# Patient Record
Sex: MALE | Race: WHITE
[De-identification: names, ages, dates, MRNs, and addresses within clinical notes are randomized per-mention and may not be internally consistent; named-entity substitution may affect disease eponyms.]

---

## 2017-12-23 ENCOUNTER — HOSPITAL ENCOUNTER (EMERGENCY)
Dept: HOSPITAL 62 - ER | Age: 72
Discharge: HOME | End: 2017-12-23
Payer: MEDICARE

## 2017-12-23 VITALS — DIASTOLIC BLOOD PRESSURE: 79 MMHG | SYSTOLIC BLOOD PRESSURE: 152 MMHG

## 2017-12-23 DIAGNOSIS — Z88.5: ICD-10-CM

## 2017-12-23 DIAGNOSIS — E11.9: ICD-10-CM

## 2017-12-23 DIAGNOSIS — I10: ICD-10-CM
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
## 2017-12-23 DIAGNOSIS — S05.02XA: Primary | ICD-10-CM

## 2017-12-23 DIAGNOSIS — Z87.891: ICD-10-CM

## 2017-12-23 PROCEDURE — 99283 EMERGENCY DEPT VISIT LOW MDM: CPT

## 2017-12-23 NOTE — ER DOCUMENT REPORT
ED Eye Complaint





- General


Chief Complaint: Redness of Eye


Stated Complaint: LEFT EYE PAIN


Time Seen by Provider: 12/23/17 15:15


Mode of Arrival: Ambulatory


Information source: Patient


Notes: 





72-year-old male presents to ED for redness to the left eye with discomfort in 

the left eye.  He states that started yesterday.  Hurts when he blinks his eye


TRAVEL OUTSIDE OF THE [place] IN LAST 30 DAYS: No





- HPI


Onset: Yesterday


Eye location: Left


Injury: No


Occurred at: Home


Quality of pain: Burning


Severity: Mild


Pain Level: 1


Associated symptoms: Burning - Slight, Foreign body sensation





- Related Data


Allergies/Adverse Reactions: 


 





morphine Allergy (Verified 12/23/17 14:12)


 











Past Medical History





- General


Information source: Patient





- Social History


Smoking Status: Former Smoker


Cigarette use (# per day): No


Chew tobacco use (# tins/day): No


Smoking Education Provided: No


Frequency of alcohol use: None


Drug Abuse: None


Lives with: Family


Family History: DM, Hypertension


Patient has suicidal ideation: No


Patient has homicidal ideation: No





- Past Medical History


Cardiac Medical History: Reports: Hx Hypertension


Pulmonary Medical History: Reports: None


EENT Medical History: Reports: None


Endocrine Medical History: Reports: Hx Diabetes Mellitus Type 2


Renal/ Medical History: Reports: None


Malignancy Medical History: Reports Other - throat


GI Medical History: Reports: None


Musculoskeltal Medical History: Reports Hx Arthritis


Psychiatric Medical History: Reports: Hx Bipolar Disorder - ptsd, Hx Post 

Traumatic Stress Disorder


Traumatic Medical History: Reports: None


Infectious Medical History: Reports: None


Past Surgical History: Reports: Hx Cholecystectomy





- Immunizations


Immunizations up to date: Yes





Review of Systems





- Review of Systems


Constitutional: No symptoms reported


EENT: Eye pain - red swollen


Cardiovascular: No symptoms reported


Respiratory: No symptoms reported


Gastrointestinal: No symptoms reported


Genitourinary: No symptoms reported


Male Genitourinary: No symptoms reported


Musculoskeletal: No symptoms reported


Skin: No symptoms reported


Hematologic/Lymphatic: No symptoms reported


Neurological/Psychological: No symptoms reported


-: Yes All other systems reviewed and negative





Physical Exam





- Vital signs


Vitals: 


 











Temp Pulse Resp BP Pulse Ox


 


 98.5 F   64   16   144/68 H  98 


 


 12/23/17 14:19  12/23/17 14:19  12/23/17 14:19  12/23/17 14:19  12/23/17 14:19











Interpretation: Normal





- General


General appearance: Appears well, Alert





- HEENT


Head: Normocephalic, Atraumatic


Eyes: Normal


Conjunctiva: Injected.  No: Purulent discharge


Cornea: Corneal abrasion, Flourescein stain uptake


Eyelashes: Normal.  No: Matted, Singed


Pupils: PERRL


Ears: Normal


External canal: Normal


Tympanic membrane: Normal


Hearing loss: Left


Sinus: Normal


Nasal: Normal


Mouth/Lips: Normal


Mucous membranes: Normal


Pharynx: Normal


Neck: Normal





- Respiratory


Respiratory status: No respiratory distress


Chest status: Nontender


Breath sounds: Normal


Chest palpation: Normal





- Cardiovascular


Rhythm: Regular


Heart sounds: Normal auscultation


Murmur: No





- Abdominal


Inspection: Normal


Distension: No distension


Bowel sounds: Normal


Tenderness: Nontender


Organomegaly: No organomegaly





- Back


Back: Normal, Nontender





- Extremities


General upper extremity: Normal inspection, Nontender, Normal color, Normal ROM

, Normal temperature


General lower extremity: Normal inspection, Nontender, Normal color, Normal ROM

, Normal temperature, Normal weight bearing.  No: Woodrow's sign





- Neurological


Neuro grossly intact: Yes


Cognition: Normal


Orientation: AAOx4


Mcleod Coma Scale Eye Opening: Spontaneous


Mcleod Coma Scale Verbal: Oriented


Isac Coma Scale Motor: Obeys Commands


Isac Coma Scale Total: 15


Speech: Normal


Motor strength normal: LUE, RUE, LLE, RLE


Sensory: Normal





- Psychological


Associated symptoms: Normal affect, Normal mood





- Skin


Skin Temperature: Warm


Skin Moisture: Dry


Skin Color: Normal





Course





- Vital Signs


Vital signs: 


 











Temp Pulse Resp BP Pulse Ox


 


 97.9 F   59 L  16   152/79 H  99 


 


 12/23/17 16:56  12/23/17 16:56  12/23/17 14:19  12/23/17 16:56  12/23/17 16:56














Discharge





- Discharge


Clinical Impression: 


Corneal abrasion


Qualifiers:


 Encounter type: initial encounter Laterality: left Qualified Code(s): S05.02XA 

- Injury of conjunctiva and corneal abrasion without foreign body, left eye, 

initial encounter





Condition: Stable


Disposition: HOME, SELF-CARE


Additional Instructions: 


Corneal Abrasion





     You have a corneal abrasion, a scratch on the surface of the eye. The pain 

of a corneal abrasion feels like a sharp particle in the eye.


     Usually, antibiotics are placed in the eye to prevent infection.  

Occasionally, medication will be placed in the eye to dilate the pupil.  This 

is done to relieve some of your discomfort and is only temporary.  Pain 

medication may be required.


     Don't drive or operate machinery until you have the use of both your eyes.

  The abrasion usually is healed in one or two days.  A follow-up examination 

to confirm healing is recommended.


     Call the doctor or return at once if you develop severe pain, decreasing 

vision, eye swelling, or purulent drainage.





EYEDROP USE:


     Eyedrops are most easily applied by pulling down on the cheek just below 

the lower eyelid.  The lower lid will pop out to form a pouch into which you 

can drop the medicine.


     A small brief sting is not unusual, especially if the eye is reddened and 

irritated already.


     Use the drops exactly as recommended.  You should see the doctor at once 

if there is a decrease in vision, swelling of the eye, or an increase in 

discomfort.








ANTIBIOTIC THERAPY:


     You have been given an antibiotic prescription.  It's important that you 

take all the medication, unless instructed otherwise by your physician.  

Failure to complete the entire course can result in relapse of your condition.


     Common side effects of antibiotics include nausea, intestinal cramping, or 

diarrhea.  Women may develop vaginal yeast infections, and babies can get yeast 

(thrush) in the mouth following the use of antibiotics.  Contact your physician 

if you develop significant side effects from this medication.


     Allergy to this antibiotic can result in hives, wheezing, faintness, or 

itching.  If symptoms of allergy occur, stop the medication and call the doctor.








FOLLOW-UP CARE:


If you have been referred to a physician for follow-up care, call the physician

s office for an appointment as you were instructed or within the next two days.

  If you experience worsening or a significant change in your symptoms, notify 

the physician immediately or return to the Emergency Department at any time for 

re-evaluation.








Forms:  Elevated Blood Pressure


Referrals: 


LARRY LAO MD [ACTIVE STAFF] - Follow up as needed

## 2018-05-17 ENCOUNTER — HOSPITAL ENCOUNTER (EMERGENCY)
Dept: HOSPITAL 62 - ER | Age: 73
Discharge: HOME | End: 2018-05-17
Payer: MEDICARE

## 2018-05-17 VITALS — SYSTOLIC BLOOD PRESSURE: 136 MMHG | DIASTOLIC BLOOD PRESSURE: 62 MMHG

## 2018-05-17 DIAGNOSIS — J30.2: ICD-10-CM

## 2018-05-17 DIAGNOSIS — R09.81: ICD-10-CM

## 2018-05-17 DIAGNOSIS — Z87.891: ICD-10-CM

## 2018-05-17 DIAGNOSIS — E11.9: ICD-10-CM

## 2018-05-17 DIAGNOSIS — J40: Primary | ICD-10-CM

## 2018-05-17 DIAGNOSIS — Z88.5: ICD-10-CM

## 2018-05-17 DIAGNOSIS — I10: ICD-10-CM

## 2018-05-17 DIAGNOSIS — R05: ICD-10-CM

## 2018-05-17 DIAGNOSIS — R06.2: ICD-10-CM

## 2018-05-17 DIAGNOSIS — Z85.810: ICD-10-CM

## 2018-05-17 PROCEDURE — 94640 AIRWAY INHALATION TREATMENT: CPT

## 2018-05-17 PROCEDURE — 71046 X-RAY EXAM CHEST 2 VIEWS: CPT

## 2018-05-17 PROCEDURE — 99283 EMERGENCY DEPT VISIT LOW MDM: CPT

## 2018-05-17 PROCEDURE — 96372 THER/PROPH/DIAG INJ SC/IM: CPT

## 2018-05-17 NOTE — ER DOCUMENT REPORT
ED Medical Screen (RME)





- General


Chief Complaint: Cough


Stated Complaint: CONGESTION/COUGH


Time Seen by Provider: 05/17/18 19:20


Mode of Arrival: Ambulatory


Information source: Patient


Notes: 





73-year-old male with a history of Type2 DM, tongue cancer, bipolar disorder 

presents with complaint of cough, nasal congestion and wheezing for 1 week.  

Wife is here with similar symptoms.  He denies any fever, chills, shortness of 

breath, chest pain.





Have greeted and performed a rapid initial assessment of this patient a 

comprehensive ED assessment and evaluation of the patient, analysis of test 

results and completion of medical decision making will be conducted by an 

additional ED provider.





PHYSICAL EXAMINATION:





GENERAL: Well-appearing, well-nourished and in no acute distress.





LUNGS: No respiratory distress, no wheezing





Musculoskeletal: Normal range of motion





NEUROLOGICAL:  Normal speech, normal gait. 





PSYCH: Normal mood, normal affect.





SKIN: Warm, Dry, normal turgor, no rashes or lesions noted.


TRAVEL OUTSIDE OF THE [place] IN LAST 30 DAYS: No





- HPI


Onset: Last week


Onset/Duration: Gradual, Persistent


Quality of pain: No pain


Associated Symptoms: Cough (productive), Sinus pain/drainage


Exacerbated by: Denies


Relieved by: Denies


Similar symptoms previously: Yes


Recently seen / treated by doctor: Yes





- Related Data


Smoking: Quit greater than 1 year


Frequency of alcohol use: None


Drug Abuse: None


Allergies/Adverse Reactions: 


 





morphine Allergy (Verified 05/17/18 18:55)


 











Past Medical History





- Social History


Chew tobacco use (# tins/day): No


Frequency of alcohol use: None


Drug Abuse: None





- Past Medical History


Cardiac Medical History: Reports: Hx Hypertension


Endocrine Medical History: Reports: Hx Diabetes Mellitus Type 2


Renal/ Medical History: Denies: Hx Peritoneal Dialysis


Musculoskeltal Medical History: Reports Hx Arthritis


Psychiatric Medical History: Reports: Hx Bipolar Disorder - ptsd, Hx Post 

Traumatic Stress Disorder


Past Surgical History: Reports: Hx Cholecystectomy





- Immunizations


Immunizations up to date: Yes





Physical Exam





- Vital signs


Vitals: 





 











Temp Pulse Resp BP Pulse Ox


 


 98.4 F   81   16   138/88 H  96 


 


 05/17/18 19:07  05/17/18 19:07  05/17/18 19:07  05/17/18 19:07  05/17/18 19:07














Course





- Vital Signs


Vital signs: 





 











Temp Pulse Resp BP Pulse Ox


 


 98.4 F   81   16   138/88 H  96 


 


 05/17/18 19:07  05/17/18 19:07  05/17/18 19:07  05/17/18 19:07  05/17/18 19:07

## 2018-05-17 NOTE — ER DOCUMENT REPORT
ED Respiratory Problem





- General


Chief Complaint: Cough


Stated Complaint: CONGESTION/COUGH


Time Seen by Provider: 05/17/18 19:20


Mode of Arrival: Ambulatory


Notes: 


73-year-old male with a history of Type2 DM, tongue cancer, bipolar disorder, 

bronchitis, presents with complaint of cough, nasal congestion and wheezing for 

1 week.  Wife is here with similar symptoms.  He usually has these symptoms 

about once a year.  Denies fevers, chest pain, leg swelling, nausea or vomiting.


TRAVEL OUTSIDE OF THE [place] IN LAST 30 DAYS: No





- Related Data


Allergies/Adverse Reactions: 


 





morphine Allergy (Verified 05/17/18 18:55)


 











Past Medical History





- General


Information source: Patient





- Social History


Smoking Status: Former Smoker


Chew tobacco use (# tins/day): No


Frequency of alcohol use: None


Drug Abuse: None


Family History: DM, Hypertension


Patient has suicidal ideation: No


Patient has homicidal ideation: No





- Past Medical History


Cardiac Medical History: Reports: Hx Hypertension


Endocrine Medical History: Reports: Hx Diabetes Mellitus Type 2


Renal/ Medical History: Denies: Hx Peritoneal Dialysis


Musculoskeltal Medical History: Reports Hx Arthritis


Psychiatric Medical History: Reports: Hx Bipolar Disorder - ptsd, Hx Post 

Traumatic Stress Disorder


Past Surgical History: Reports: Hx Cholecystectomy





- Immunizations


Immunizations up to date: Yes





Review of Systems





- Review of Systems


Notes: 


REVIEW OF SYSTEMS:


CONSTITUTIONAL: -fevers, -chills


EENT: -eye pain, -difficulty swallowing, +nasal congestion


CARDIOVASCULAR: -chest pain, -syncope.


RESPIRATORY: +cough, -SOB


GASTROINTESTINAL: -abdominal pain, -nausea, -vomiting, -diarrhea


GENITOURINARY: -dysuria, -hematuria


MUSCULOSKELETAL: -back pain, -neck pain


SKIN: -rash or skin lesions.


HEMATOLOGIC: -easy bruising or bleeding.


LYMPHATIC: -swollen, enlarged glands.


NEUROLOGICAL: -altered mental status or loss of consciousness, -headache, -

neurologic symptoms


PSYCHIATRIC: -anxiety, -depression.


ALL OTHER SYSTEMS REVIEWED AND NEGATIVE.





Physical Exam





- Vital signs


Vitals: 


 











Temp Pulse Resp BP Pulse Ox


 


 98.4 F   81   16   138/88 H  96 


 


 05/17/18 19:07  05/17/18 19:07  05/17/18 19:07  05/17/18 19:07  05/17/18 19:07














- Notes


Notes: 


PHYSICAL EXAMINATION:


GENERAL: Well-appearing, well-nourished and in no acute distress.


HEAD: Atraumatic, normocephalic.


EYES: Pupils equal round and reactive to light, extraocular movements intact, 

sclera anicteric, conjunctiva are normal.


ENT: Swollen nasal turbinates with clear drainage, cobblestoning of posterior 

pharynx, nares patent, oropharynx clear without exudates.  Moist mucous 

membranes.


NECK: Normal range of motion, supple without lymphadenopathy


LUNGS: Breath sounds clear to auscultation bilaterally and equal.  Mild 

wheezing.


HEART: Regular rate and rhythm without murmurs


ABDOMEN: Soft, nontender, normoactive bowel sounds.  No guarding, no rebound.  

No masses appreciated.


EXTREMITIES: Normal range of motion, no pitting or edema.  No cyanosis.


NEUROLOGICAL: Cranial nerves grossly intact.  Normal speech, normal gait.  

Normal sensory and motor exams.


PSYCH: Normal mood, normal affect.


SKIN: Warm, Dry, normal turgor, no rashes or lesions noted.





Course





- Re-evaluation


Re-evalutation: 


Patient appears very well.  Suspect his symptoms are related to allergic 

rhinitis and bronchitis.  No signs of pneumonia on chest x-ray.  Will discharge 

patient home with Flonase, Zyrtec instructions to use his albuterol with follow-

up at his primary care physician.





- Vital Signs


Vital signs: 


 











Temp Pulse Resp BP Pulse Ox


 


 98.7 F   66   14   136/62 H  97 


 


 05/17/18 19:12  05/17/18 19:12  05/17/18 19:12  05/17/18 19:12  05/17/18 19:12














- Diagnostic Test


Radiology reviewed: Image reviewed, Reports reviewed


Radiology results interpreted by me: 


CXR: NAD





Discharge





- Discharge


Clinical Impression: 


 Bronchitis





Allergic rhinitis


Qualifiers:


 Allergic rhinitis trigger: unspecified Allergic rhinitis seasonality: seasonal 

Qualified Code(s): J30.2 - Other seasonal allergic rhinitis





Condition: Stable


Disposition: HOME, SELF-CARE


Additional Instructions: 


BRONCHITIS:





     You have acute bronchitis.  This disease is an infection or inflammation 

of the air passageways in your lungs.  Symptoms usually include cough, low 

grade fever, shortness of breath, and wheezing.  The cough usually persists for 

a couple of weeks.


     Most cases of bronchitis get better without antibiotics.  We prescribe 

antibiotics when we believe bacteria are damaging your airways, or if there's 

high risk the bronchitis will worsen into pneumonia.


     Increase your fluid intake. A cool mist humidifier may make your lungs 

more comfortable.  An expectorant (cough medicine that loosens phlegm) can 

help.  If you smoke, STOP!!!  Recovery from bronchitis can be somewhat slow, 

but you should see improvement within a day or two.


     Repeated episodes of bronchitis may result in lung damage -- for example, 

chronic bronchitis, recurrent pneumonias, or emphysema.


     Call the doctor if you develop increasing fever, shortness of breath, 

chest pain, bloody sputum, or otherwise worsen.  If you have not improved at 

all after several days, contact the physician.








STEROID MEDICATION:


     You have been given an injection of or oral medicine of the cortisone/

steroid class.  This medication is used to control inflammation or allergy.  

Jono t is usually only given for a short period of time, until the acute process 

subsides.


     There are usually no side effects from short-term use of cortisone-like 

medications.  Some persons feel an increased sense of well-being and are not 

sleepy at bedtime.  Long-term use of cortisone medications is best avoided, 

unless required for a severe condition.  If your condition does not remit, or 

relapses after the course of corticosteroid medication, you should consult your 

physician.





USE OF ACETAMINOPHEN (Tylenol):


     Acetaminophen may be taken for pain relief or fever control. It's much 

safer than aspirin, offering a wider range of "safe" dosages.  It is safe 

during pregnancy.  Some brand names are Tylenol, Panadol, Datril, Anacin 3, 

Tempra, and Liquiprin. Acetaminophen can be repeated every four hours.  The 

following are maximum recommended dosages:


>89 pounds or adults          650 mg to 900 mg


Acetaminophen can be repeated every four hours.  Maximum dose not to exceed 

4000 mg a day.








SMOKING:


     If you smoke, you should stop smoking.  The tar and chemicals in cigarette 

smoke are harmful.  Smoking has been shown to cause:


          emphysema


          chronic bronchitis


          lung cancer


          mouth and throat cancer


          stomach and pancreas cancer


          premature aging


          birth defects


     In addition, smoking increases ear and lung infections in children of 

smokers.








FOLLOW-UP CARE:


If you have been referred to a physician for follow-up care, call the physician

s office for an appointment as you were instructed or within the next two days.

  If you experience worsening or a significant change in your symptoms, notify 

the physician immediately or return to the Emergency Department at any time for 

re-evaluation.





Prescriptions: 


Fluticasone Propionate [Flonase Nasal Spray 50 Mcg/Spray 16 gm] 1 spray NASL 

Q12 #1 inhaler


Forms:  Elevated Blood Pressure


Referrals: 


BOB ANDRADE MD [ACTIVE STAFF] - Follow up as needed

## 2018-05-17 NOTE — RADIOLOGY REPORT (SQ)
EXAM DESCRIPTION:  CHEST 2 VIEWS



COMPLETED DATE/TIME:  5/17/2018 7:31 pm



REASON FOR STUDY:  cough wheezing



COMPARISON:  None.



EXAM PARAMETERS:  NUMBER OF VIEWS: two views

TECHNIQUE: Digital Frontal and Lateral radiographic views of the chest acquired.

RADIATION DOSE: NA

LIMITATIONS: none



FINDINGS:  LUNGS AND PLEURA: No opacities, masses or pneumothorax. No pleural effusion.

MEDIASTINUM AND HILAR STRUCTURES: No masses or contour abnormalities.

HEART AND VASCULAR STRUCTURES: Heart normal size.  No evidence for failure.

BONES: No acute findings.

HARDWARE: None in the chest.

OTHER: No other significant finding.



IMPRESSION:  NO ACUTE RADIOGRAPHIC FINDING IN THE CHEST.



TECHNICAL DOCUMENTATION:  JOB ID:  [id number]

 2011 [employer]- All Rights Reserved



Reading location - IP/workstation name: [email or web address]

## 2018-07-21 ENCOUNTER — HOSPITAL ENCOUNTER (OUTPATIENT)
Dept: HOSPITAL 62 - ER | Age: 73
Setting detail: OBSERVATION
LOS: 2 days | Discharge: HOME | End: 2018-07-23
Attending: INTERNAL MEDICINE | Admitting: INTERNAL MEDICINE
Payer: MEDICARE

## 2018-07-21 DIAGNOSIS — R11.10: ICD-10-CM

## 2018-07-21 DIAGNOSIS — E11.9: ICD-10-CM

## 2018-07-21 DIAGNOSIS — Z82.49: ICD-10-CM

## 2018-07-21 DIAGNOSIS — F40.240: ICD-10-CM

## 2018-07-21 DIAGNOSIS — Z92.3: ICD-10-CM

## 2018-07-21 DIAGNOSIS — Z53.29: ICD-10-CM

## 2018-07-21 DIAGNOSIS — Z90.49: ICD-10-CM

## 2018-07-21 DIAGNOSIS — G83.24: ICD-10-CM

## 2018-07-21 DIAGNOSIS — Z98.890: ICD-10-CM

## 2018-07-21 DIAGNOSIS — I63.9: Primary | ICD-10-CM

## 2018-07-21 DIAGNOSIS — R13.11: ICD-10-CM

## 2018-07-21 DIAGNOSIS — J18.1: ICD-10-CM

## 2018-07-21 DIAGNOSIS — I69.354: ICD-10-CM

## 2018-07-21 DIAGNOSIS — R20.0: ICD-10-CM

## 2018-07-21 DIAGNOSIS — Z85.810: ICD-10-CM

## 2018-07-21 DIAGNOSIS — I10: ICD-10-CM

## 2018-07-21 DIAGNOSIS — Z87.891: ICD-10-CM

## 2018-07-21 LAB
ADD MANUAL DIFF: NO
ALBUMIN SERPL-MCNC: 4.2 G/DL (ref 3.5–5)
ALP SERPL-CCNC: 134 U/L (ref 38–126)
ALT SERPL-CCNC: 19 U/L (ref 21–72)
ANION GAP SERPL CALC-SCNC: 18 MMOL/L (ref 5–19)
AST SERPL-CCNC: 18 U/L (ref 17–59)
BASOPHILS # BLD AUTO: 0 10^3/UL (ref 0–0.2)
BASOPHILS NFR BLD AUTO: 0.2 % (ref 0–2)
BILIRUB DIRECT SERPL-MCNC: 0.5 MG/DL (ref 0–0.4)
BILIRUB SERPL-MCNC: 1.4 MG/DL (ref 0.2–1.3)
BUN SERPL-MCNC: 16 MG/DL (ref 7–20)
CALCIUM: 9.5 MG/DL (ref 8.4–10.2)
CHLORIDE SERPL-SCNC: 103 MMOL/L (ref 98–107)
CK MB SERPL-MCNC: 0.37 NG/ML (ref ?–4.55)
CK SERPL-CCNC: 30 U/L (ref 55–170)
CO2 SERPL-SCNC: 23 MMOL/L (ref 22–30)
EOSINOPHIL # BLD AUTO: 0.2 10^3/UL (ref 0–0.6)
EOSINOPHIL NFR BLD AUTO: 0.9 % (ref 0–6)
ERYTHROCYTE [DISTWIDTH] IN BLOOD BY AUTOMATED COUNT: 14.2 % (ref 11.5–14)
GLUCOSE SERPL-MCNC: 219 MG/DL (ref 75–110)
HCT VFR BLD CALC: 36.6 % (ref 37.9–51)
HGB BLD-MCNC: 12.7 G/DL (ref 13.5–17)
INR PPP: 1.13
LYMPHOCYTES # BLD AUTO: 1 10^3/UL (ref 0.5–4.7)
LYMPHOCYTES NFR BLD AUTO: 5.5 % (ref 13–45)
MCH RBC QN AUTO: 31.7 PG (ref 27–33.4)
MCHC RBC AUTO-ENTMCNC: 34.6 G/DL (ref 32–36)
MCV RBC AUTO: 92 FL (ref 80–97)
MONOCYTES # BLD AUTO: 0.9 10^3/UL (ref 0.1–1.4)
MONOCYTES NFR BLD AUTO: 5.1 % (ref 3–13)
NEUTROPHILS # BLD AUTO: 15.5 10^3/UL (ref 1.7–8.2)
NEUTS SEG NFR BLD AUTO: 88.3 % (ref 42–78)
PLATELET # BLD: 237 10^3/UL (ref 150–450)
POTASSIUM SERPL-SCNC: 3.8 MMOL/L (ref 3.6–5)
PROT SERPL-MCNC: 8.1 G/DL (ref 6.3–8.2)
PROTHROMBIN TIME: 15.1 SEC (ref 11.4–15.4)
RBC # BLD AUTO: 3.99 10^6/UL (ref 4.35–5.55)
SODIUM SERPL-SCNC: 143.7 MMOL/L (ref 137–145)
TOTAL CELLS COUNTED % (AUTO): 100 %
TROPONIN I SERPL-MCNC: < 0.012 NG/ML
WBC # BLD AUTO: 17.5 10^3/UL (ref 4–10.5)

## 2018-07-21 PROCEDURE — 36415 COLL VENOUS BLD VENIPUNCTURE: CPT

## 2018-07-21 PROCEDURE — 87040 BLOOD CULTURE FOR BACTERIA: CPT

## 2018-07-21 PROCEDURE — 93880 EXTRACRANIAL BILAT STUDY: CPT

## 2018-07-21 PROCEDURE — 92611 MOTION FLUOROSCOPY/SWALLOW: CPT

## 2018-07-21 PROCEDURE — 82550 ASSAY OF CK (CPK): CPT

## 2018-07-21 PROCEDURE — 71045 X-RAY EXAM CHEST 1 VIEW: CPT

## 2018-07-21 PROCEDURE — 85610 PROTHROMBIN TIME: CPT

## 2018-07-21 PROCEDURE — 84484 ASSAY OF TROPONIN QUANT: CPT

## 2018-07-21 PROCEDURE — 93010 ELECTROCARDIOGRAM REPORT: CPT

## 2018-07-21 PROCEDURE — 80053 COMPREHEN METABOLIC PANEL: CPT

## 2018-07-21 PROCEDURE — 96368 THER/DIAG CONCURRENT INF: CPT

## 2018-07-21 PROCEDURE — G0378 HOSPITAL OBSERVATION PER HR: HCPCS

## 2018-07-21 PROCEDURE — 99285 EMERGENCY DEPT VISIT HI MDM: CPT

## 2018-07-21 PROCEDURE — 96365 THER/PROPH/DIAG IV INF INIT: CPT

## 2018-07-21 PROCEDURE — 80061 LIPID PANEL: CPT

## 2018-07-21 PROCEDURE — 82553 CREATINE MB FRACTION: CPT

## 2018-07-21 PROCEDURE — 93005 ELECTROCARDIOGRAM TRACING: CPT

## 2018-07-21 PROCEDURE — 97163 PT EVAL HIGH COMPLEX 45 MIN: CPT

## 2018-07-21 PROCEDURE — 97165 OT EVAL LOW COMPLEX 30 MIN: CPT

## 2018-07-21 PROCEDURE — 74230 X-RAY XM SWLNG FUNCJ C+: CPT

## 2018-07-21 PROCEDURE — 82962 GLUCOSE BLOOD TEST: CPT

## 2018-07-21 PROCEDURE — 70450 CT HEAD/BRAIN W/O DYE: CPT

## 2018-07-21 PROCEDURE — 85025 COMPLETE CBC W/AUTO DIFF WBC: CPT

## 2018-07-21 NOTE — ER DOCUMENT REPORT
ED Medical Screen (RME)





- General


Chief Complaint: S/S of Possible Stroke


Stated Complaint: POSSIBLE STROKE


Time Seen by Provider: 07/21/18 14:56


TRAVEL OUTSIDE OF THE [place] IN LAST 30 DAYS: No





- HPI


Notes: 





07/21/18 14:59


Patient coming in for left-sided weakness ongoing for greater than left wrist.  

Patient states this morning got up could not stand therefore felt.  Patient 

does have history of tongue cancer.





- Related Data


Allergies/Adverse Reactions: 


 





morphine Allergy (Verified 07/21/18 13:39)


 











Past Medical History





- Past Medical History


Cardiac Medical History: Reports: Hx Hypertension


Endocrine Medical History: Reports: Hx Diabetes Mellitus Type 2


Renal/ Medical History: Denies: Hx Peritoneal Dialysis


Musculoskeltal Medical History: Reports Hx Arthritis


Psychiatric Medical History: Reports: Hx Bipolar Disorder - ptsd, Hx Post 

Traumatic Stress Disorder


Past Surgical History: Reports: Hx Cholecystectomy





- Immunizations


Immunizations up to date: Yes





Review of Systems





- Review of Systems


Constitutional: Weakness





Physical Exam





- Vital signs


Vitals: 





 











Temp Pulse Resp BP Pulse Ox


 


 98.3 F   79   20   116/63   98 


 


 07/21/18 13:55  07/21/18 13:55  07/21/18 13:55  07/21/18 13:55  07/21/18 13:55














- Extremities


Notes: 





Patient with symmetrical smile in triage patient with slight weakness on the 

left upper extremity as far as  strength however he is able to push pull 





Course





- Vital Signs


Vital signs: 





 











Temp Pulse Resp BP Pulse Ox


 


 98.3 F   79   20   116/63   98 


 


 07/21/18 13:55  07/21/18 13:55  07/21/18 13:55  07/21/18 13:55  07/21/18 13:55

## 2018-07-21 NOTE — RADIOLOGY REPORT (SQ)
EXAM DESCRIPTION:  CT HEAD WITHOUT



COMPLETED DATE/TIME:  7/21/2018 3:21 pm



REASON FOR STUDY:  left up ext weakness >24



COMPARISON:  None.



TECHNIQUE:  Axial images acquired through the brain without intravenous contrast.  Images reviewed wi
th bone, brain and subdural windows.   Images stored on PACS.

All CT scanners at this facility use dose modulation, iterative reconstruction, and/or weight based d
osing when appropriate to reduce radiation dose to as low as reasonably achievable (ALARA).

[facility]: Dose Right  [facility]: CareDose    [facility]: Dose Right    [facility]: Teradose 4D    [facility]: Smart Technologies



RADIATION DOSE:  CT Rad equipment meets quality standard of care and radiation dose reduction techniq
ues were employed. CTDIvol: 53.2 mGy. DLP: 911 mGy-cm.mGy.



LIMITATIONS:  None.



FINDINGS:   VENTRICLES: Prominent.

CEREBRUM: No mass effect.  No hemorrhage.  No midline shift.  Areas of low density in the white matte
r most likely due to chronic micro-vascular ischemic change.  No evidence for acute territorial infar
ction.

CEREBELLUM: No hemorrhage.  No alteration of density.  No evidence for acute infarction.

EXTRAAXIAL SPACES: Age-related involutional change.  No fluid collections.

ORBITS AND GLOBE: Symmetrical contour of the globes.

CALVARIUM: No depressed fracture.

PARANASAL SINUSES: No air-fluid level.

SOFT TISSUES:  No hematoma.



IMPRESSION:  No acute intracranial hemorrhage or acute territorial infarct.  Mild chronic changes of 
atrophy and microvascular ischemia.

EVIDENCE OF ACUTE STROKE: NO.



TECHNICAL DOCUMENTATION:  JOB ID:  [id number]

[facility]

Quality ID # [id number]: Final reports with documentation of one or more dose reduction techniques (e.g., Au
tomated exposure control, adjustment of the mA and/or kV according to patient size, use of iterative 
reconstruction technique)

 2011 [employer]- All Rights Reserved



Reading location - IP/workstation name: [email or web address]

## 2018-07-21 NOTE — RADIOLOGY REPORT (SQ)
EXAM DESCRIPTION:  CHEST SINGLE VIEW



COMPLETED DATE/TIME:  7/21/2018 3:32 pm



REASON FOR STUDY:  weakness



COMPARISON:  Chest x-ray 5/17/2018.



EXAM PARAMETERS:  NUMBER OF VIEWS: One view.

TECHNIQUE: Single frontal radiographic view of the chest acquired.

RADIATION DOSE: NA

LIMITATIONS: None.



FINDINGS:  LUNGS AND PLEURA: There is right perihilar airspace opacity.  No sizable pleural effusion 
or pneumothorax.

MEDIASTINUM AND HILAR STRUCTURES: Contour within normal limits.

HEART AND VASCULAR STRUCTURES: Heart normal in size.  Normal vasculature.

BONES: Multilevel degenerative changes are noted within the spine.

HARDWARE: None in the chest.



IMPRESSION:  Right perihilar airspace opacity, may be secondary to pneumonia.  Radiographic followup 
recommended to ensure complete resolution and exclude a different etiology.



TECHNICAL DOCUMENTATION:  JOB ID:  [id number]

[id number]

 2011 [employer]- All Rights Reserved



Reading location - IP/workstation name: [email or web address]

## 2018-07-21 NOTE — PDOC H&P
History of Present Illness


Admission Date/PCP: 


  





  ROSA RENO MD





Patient complains of: Altered mental status and left arm weakness


History of Present Illness: 


DARÍO HARTMANN is a 73 year old male with past medical history of essential 

hypertension, dyslipidemia, diabetes mellitus type 2 on insulin and tongue 

cancer status post resection and XRT. Patient presents to [facility]'s emergency room this afternoon complaining of episode of dizziness 

yesterday at 10 AM in which he fell landing on his knee.  Patient since then 

has had left-sided weakness to the left upper extremity.  Patient states he has 

a decreased  to the left hand and has a decrease in sensation to the left 

upper arm.  Patient's family states that he had confusion after this episode 

yesterday that has gradually started to resolve and return to his normal 

baseline today.  Wife states that he does have some memory issues after 

treatment of tongue cancer with radiation.  Patient's wife states that his 

confusion yesterday was not normal for him.  Patient additionally has had a 

cough for the past 2 weeks.  Patient was placed on amoxicillin 5 days ago but 

only took it for 2 days and then refused to take any more.  Patient denies any 

chest pain, back pain headache or any dizzy symptoms at this time.  Patient did 

have an episode of vomiting yesterday when this episode occurred.  Patient's 

last known well was prior to 10 AM yesterday.











Past Medical History


Cardiac Medical History: Reports: Hypertension


Pulmonary Medical History: Reports: None


EENT Medical History: Reports: None


Neurological Medical History: Reports: None


Endocrine Medical History: Reports: Diabetes Mellitus Type 2


Renal/ Medical History: Reports: None


Malignancy Medical History: Reports: Other - Tongue cancer


GI Medical History: Reports: None


Musculoskeltal Medical History: Reports: Arthritis


Skin Medical History: Reports: None


Psychiatric Medical History: Reports: Bipolar Disorder - ptsd, Post Traumatic 

Stress Disorder


Traumatic Medical History: Reports: None


Hematology: Reports: None


Infectious Medical History: Reports: None





Past Surgical History


Past Surgical History: Reports: Cholecystectomy, Other - Glossal resection  PEG 

tube insertion





Social History


Information Source: Patient


Lives with: Family


Smoking Status: Former Smoker


Last Time Smoked: Greater than 20 years ago


Frequency of Alcohol Use: Occasional


Hx Recreational Drug Use: No


Hx Prescription Drug Abuse: No





- Advance Directive


Resuscitation Status: Full Code


Surrogate healthcare decision maker:: 





Wife





Family History


Family History: DM, Hypertension


Parental Family History Reviewed: Yes


Children Family History Reviewed: Yes


Sibling(s) Family History Reviewed.: Yes





Medication/Allergy


Home Medications: 








Fluticasone Propionate [Flonase Nasal Spray 50 Mcg/Spray 16 gm] 1 spray NASL 

Q12 #1 inhaler 05/17/18 








Allergies/Adverse Reactions: 


 





morphine Allergy (Verified 07/21/18 13:39)


 











Review of Systems


Constitutional: PRESENT: chills, fever(s) - Days ago


Eyes: ABSENT: visual disturbances


Ears: ABSENT: hearing changes


Cardiovascular: ABSENT: chest pain, dyspnea on exertion, edema, orthropnea, 

palpitations


Respiratory: ABSENT: cough, hemoptysis


Gastrointestinal: ABSENT: abdominal pain, constipation, diarrhea, hematemesis, 

hematochezia, nausea, vomiting


Genitourinary: ABSENT: dysuria, hematuria


Musculoskeletal: ABSENT: joint swelling


Integumentary: ABSENT: rash, wounds


Neurological: ABSENT: abnormal gait, abnormal speech, confusion, dizziness, 

focal weakness, syncope


Psychiatric: ABSENT: anxiety, depression, homidical ideation, suicidal ideation


Endocrine: ABSENT: cold intolerance, heat intolerance, polydipsia, polyuria


Hematologic/Lymphatic: ABSENT: easy bleeding, easy bruising





Physical Exam


Vital Signs: 


 











Temp Pulse Resp BP Pulse Ox


 


 98.3 F   79   20   116/63   95 


 


 07/21/18 13:55  07/21/18 13:55  07/21/18 13:55  07/21/18 13:55  07/21/18 15:01








 Intake & Output











 07/20/18 07/21/18 07/22/18





 06:59 06:59 06:59


 


Weight   70.307 kg











General appearance: PRESENT: no acute distress, well-developed, well-nourished


Head exam: PRESENT: atraumatic, normocephalic


Eye exam: PRESENT: conjunctiva pink, EOMI, PERRLA.  ABSENT: scleral icterus


Ear exam: PRESENT: normal external ear exam


Mouth exam: PRESENT: moist, tongue midline


Neck exam: ABSENT: carotid bruit, JVD, lymphadenopathy, thyromegaly


Respiratory exam: PRESENT: crackles, symmetrical, unlabored - left basilar 

crackles.  ABSENT: rales, rhonchi, wheezes


Cardiovascular exam: PRESENT: RRR.  ABSENT: diastolic murmur, rubs, systolic 

murmur


Pulses: PRESENT: normal dorsalis pedis pul


Vascular exam: PRESENT: normal capillary refill


GI/Abdominal exam: PRESENT: normal bowel sounds, soft.  ABSENT: distended, 

guarding, mass, organolmegaly, rebound, tenderness


Rectal exam: PRESENT: deferred


Extremities exam: PRESENT: full ROM.  ABSENT: calf tenderness, clubbing, pedal 

edema


Musculoskeletal exam: PRESENT: ambulatory, dislocation, full ROM


Neurological exam: PRESENT: alert, awake, oriented to person, oriented to place

, oriented to time, oriented to situation, CN II-XII grossly intact.  ABSENT: 

motor sensory deficit


Psychiatric exam: PRESENT: appropriate affect, normal mood.  ABSENT: homicidal 

ideation, suicidal ideation


Skin exam: PRESENT: dry, intact, warm.  ABSENT: cyanosis, rash





Results


Laboratory Results: 


 





 07/21/18 15:27 





 07/21/18 15:27 





 











  07/21/18 07/21/18





  15:27 15:27


 


WBC  17.5 H 


 


RBC  3.99 L 


 


Hgb  12.7 L 


 


Hct  36.6 L 


 


MCV  92 


 


MCH  31.7 


 


MCHC  34.6 


 


RDW  14.2 H 


 


Plt Count  237 


 


Seg Neutrophils %  88.3 H 


 


Lymphocytes %  5.5 L 


 


Monocytes %  5.1 


 


Eosinophils %  0.9 


 


Basophils %  0.2 


 


Absolute Neutrophils  15.5 H 


 


Absolute Lymphocytes  1.0 


 


Absolute Monocytes  0.9 


 


Absolute Eosinophils  0.2 


 


Absolute Basophils  0.0 


 


Sodium   143.7


 


Potassium   3.8


 


Chloride   103


 


Carbon Dioxide   23


 


Anion Gap   18


 


BUN   16


 


Creatinine   1.11


 


Est GFR ( Amer)   > 60


 


Est GFR (Non-Af Amer)   > 60


 


Glucose   219 H


 


Calcium   9.5


 


Total Bilirubin   1.4 H


 


AST   18


 


ALT   19 L


 


Alkaline Phosphatase   134 H


 


Total Protein   8.1


 


Albumin   4.2








 











  07/21/18 07/21/18





  15:27 15:27


 


Creatine Kinase  30 L 


 


CK-MB (CK-2)   0.37


 


Troponin I   < 0.012











Impressions: 


 





Chest X-Ray  07/21/18 15:01


IMPRESSION:  Right perihilar airspace opacity, may be secondary to pneumonia.  

Radiographic followup recommended to ensure complete resolution and exclude a 

different etiology.


 








Head CT  07/21/18 15:01


IMPRESSION:  No acute intracranial hemorrhage or acute territorial infarct.  

Mild chronic changes of atrophy and microvascular ischemia.


EVIDENCE OF ACUTE STROKE: NO.


 














Assessment & Plan





- Diagnosis


(1) CVA (cerebral vascular accident)


Is this a current diagnosis for this admission?: Yes   


Plan: 


We will admit to rule out possible CVA.  He has minimal left hand weaknes 

initial CT scan was negative.








(2) Diabetes mellitus


Qualifiers: 


   Diabetes mellitus type: type 2 


Is this a current diagnosis for this admission?: Yes   


Plan: 


Lantus and sliding scale insulin








(3) Essential hypertension


Is this a current diagnosis for this admission?: Yes   


Plan: 


Since of the present time.








(4) Decreased  strength of left hand


Is this a current diagnosis for this admission?: Yes   


Plan: 


As above in #1.  He started on aspirin and statin








(5) Left arm numbness


Is this a current diagnosis for this admission?: Yes   





(6) Pneumonia


Qualifiers: 


   Pneumonia type: due to unspecified organism   Laterality: right   Lung 

location: unspecified part of lung   Qualified Code(s): J18.9 - Pneumonia, 

unspecified organism   


Is this a current diagnosis for this admission?: Yes   


Plan: 


Right lower lobe infiltrate.  His wife had bronchitis and pneumonia 2 weeks 

ago.  He was started on amoxicillin 5 days ago and only take it for 2 days and 

stop.  He states he had a fever back then but none since then.  He is room air 

oxygen saturations 98% he is not tachycardic or hypotensive.  He was started on 

oral Levaquin








- Time


Time Spent: 50 to 70 Minutes


Critical Time spent with patient: 25-34 minutes


Medications reviewed and adjusted accordingly: Yes


Anticipated discharge: Home


Within: within 24 hours

## 2018-07-22 LAB
CHOLEST SERPL-MCNC: 154.46 MG/DL (ref 0–200)
LDLC SERPL DIRECT ASSAY-MCNC: 88 MG/DL (ref ?–100)
TRIGL SERPL-MCNC: 127 MG/DL (ref ?–150)
VLDLC SERPL CALC-MCNC: 25 MG/DL (ref 10–31)

## 2018-07-22 RX ADMIN — ASPIRIN SCH MG: 325 TABLET, DELAYED RELEASE ORAL at 10:48

## 2018-07-22 RX ADMIN — Medication SCH ML: at 14:14

## 2018-07-22 RX ADMIN — ENOXAPARIN SODIUM SCH MG: 40 INJECTION SUBCUTANEOUS at 10:49

## 2018-07-22 RX ADMIN — Medication SCH ML: at 00:29

## 2018-07-22 RX ADMIN — ATORVASTATIN CALCIUM SCH MG: 40 TABLET, FILM COATED ORAL at 00:29

## 2018-07-22 RX ADMIN — LEVOFLOXACIN SCH MG: 750 TABLET, FILM COATED ORAL at 10:48

## 2018-07-22 RX ADMIN — GUAIFENESIN SCH MG: 600 TABLET, EXTENDED RELEASE ORAL at 10:48

## 2018-07-22 RX ADMIN — Medication SCH ML: at 22:24

## 2018-07-22 RX ADMIN — GUAIFENESIN SCH MG: 600 TABLET, EXTENDED RELEASE ORAL at 00:28

## 2018-07-22 RX ADMIN — INSULIN LISPRO PRN UNIT: 100 INJECTION, SOLUTION INTRAVENOUS; SUBCUTANEOUS at 18:07

## 2018-07-22 RX ADMIN — INSULIN LISPRO PRN UNIT: 100 INJECTION, SOLUTION INTRAVENOUS; SUBCUTANEOUS at 12:36

## 2018-07-22 RX ADMIN — GUAIFENESIN SCH MG: 600 TABLET, EXTENDED RELEASE ORAL at 22:22

## 2018-07-22 RX ADMIN — ATORVASTATIN CALCIUM SCH MG: 40 TABLET, FILM COATED ORAL at 22:21

## 2018-07-22 RX ADMIN — Medication SCH ML: at 07:28

## 2018-07-22 NOTE — PDOC PROGRESS REPORT
Subjective


Progress Note for:: 07/22/18


Subjective:: 


Patient is seen resting in bed. He is awake, alert and oriented x 3.  His wife 

is at the bedside.  He states he is hungry.  Nursing apparently made him n.p.o. 

because of difficulty taking pills last night.  He does have a history of 

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  His wife states 

he normally consumes regular liquids and soft diet at home.  She notes 

occasional coughing after he does drink liquids.  He denies any further arm 

numbness or weakness.  He denies any further confusion.  He denies any 

shortness breath or dyspnea at rest.  Continues to have mostly nonproductive 

cough.  Denies nausea, vomiting or abdominal pain.  He denies any significant 

arthralgias.  Remaining review of systems is negative.


Reason For Visit: 


LEFT ARM WEAKNESS,ALTERED MENTAL STATUS








Physical Exam


Vital Signs: 


 











Temp Pulse Resp BP Pulse Ox


 


 99.4 F   72   19   112/66   95 


 


 07/22/18 07:22  07/22/18 07:22  07/22/18 07:22  07/22/18 07:22  07/22/18 07:22








 Intake & Output











 07/21/18 07/22/18 07/23/18





 06:59 06:59 06:59


 


Intake Total  20 


 


Balance  20 


 


Weight  68.1 kg 











General appearance: PRESENT: no acute distress, thin, well-developed, well-

nourished


Head exam: PRESENT: atraumatic, normocephalic


Eye exam: PRESENT: conjunctiva pink, EOMI, PERRLA.  ABSENT: scleral icterus


Ear exam: PRESENT: normal external ear exam


Mouth exam: PRESENT: moist, tongue midline


Throat exam: PRESENT: post pharyngeal erythema


Neck exam: ABSENT: carotid bruit, JVD, lymphadenopathy, thyromegaly


Respiratory exam: PRESENT: clear to auscultation mary.  ABSENT: rales, rhonchi, 

wheezes


Cardiovascular exam: PRESENT: RRR.  ABSENT: diastolic murmur, rubs, systolic 

murmur


Pulses: PRESENT: normal carotid pulses, normal radial pulses


Vascular exam: PRESENT: normal capillary refill


GI/Abdominal exam: PRESENT: normal bowel sounds, soft.  ABSENT: distended, 

guarding, mass, organolmegaly, rebound, tenderness


Rectal exam: PRESENT: deferred


Extremities exam: PRESENT: full ROM.  ABSENT: calf tenderness, clubbing, pedal 

edema


Musculoskeletal exam: PRESENT: ambulatory, full ROM, normal inspection


Neurological exam: PRESENT: alert, awake, oriented to person, oriented to place

, oriented to time, oriented to situation, CN II-XII grossly intact.  ABSENT: 

motor sensory deficit


Psychiatric exam: PRESENT: appropriate affect, normal mood.  ABSENT: homicidal 

ideation, suicidal ideation


Skin exam: PRESENT: dry, intact, warm.  ABSENT: cyanosis, rash





Results


Laboratory Results: 


 











  07/22/18





  06:35


 


Triglycerides  127


 


Cholesterol  154.46


 


LDL Cholesterol Direct  88


 


VLDL Cholesterol  25.0


 


HDL Cholesterol  31 L








 











  07/21/18 07/22/18 07/22/18





  19:30 01:20 06:35


 


Troponin I  < 0.012  < 0.012  < 0.012











Impressions: 


 





Chest X-Ray  07/21/18 15:01


IMPRESSION:  Right perihilar airspace opacity, may be secondary to pneumonia.  

Radiographic followup recommended to ensure complete resolution and exclude a 

different etiology.


 








Head CT  07/21/18 15:01


IMPRESSION:  No acute intracranial hemorrhage or acute territorial infarct.  

Mild chronic changes of atrophy and microvascular ischemia.


EVIDENCE OF ACUTE STROKE: NO.


 














Assessment & Plan





- Diagnosis


(1) CVA (cerebral vascular accident)


Is this a current diagnosis for this admission?: Yes   


Plan: 


We will admit to rule out possible CVA.  CT of the head yesterday after 24 

hours of symptoms showed no CVA.  He does not want to have an MRI.  His 

symptoms have completely resolved this morning. Will check carotid ultrasound 

in the am 








(2) Essential hypertension


Is this a current diagnosis for this admission?: Yes   





(3) Decreased  strength of left hand


Is this a current diagnosis for this admission?: Yes   





(4) Pneumonia


Qualifiers: 


   Pneumonia type: due to unspecified organism   Laterality: right   Lung 

location: middle lobe of lung   Qualified Code(s): J18.1 - Lobar pneumonia, 

unspecified organism   


Is this a current diagnosis for this admission?: Yes   


Plan: 


Right lower lobe infiltrate.  His wife had bronchitis and pneumonia 2 weeks 

ago.  He was started on amoxicillin 5 days ago and only take it for 2 days and 

stop.  He states he had a fever back then but none since then.  He is room air 

oxygen saturations 98% he is not tachycardic or hypotensive.  He was started on 

oral Levaquin








(5) Dysphagia


Qualifiers: 


   Dysphagia type: oral phase   Qualified Code(s): R13.11 - Dysphagia, oral 

phase   


Is this a current diagnosis for this admission?: Yes   


Plan: 


Speech therapy consult.  He has history of glossal cancer 3 years ago with 

resection and XRT.  His wife does noted some difficulties with coughing after 

drinking.  Speech therapy is recommended a cookie study for tomorrow.  He does 

well with nectar thickened liquid and soft diet will continue this.








(6) Left arm numbness


Is this a current diagnosis for this admission?: Yes   


Plan: 


This is completely resolved.








(7) Diabetes mellitus


Qualifiers: 


   Diabetes mellitus type: type 2 


Is this a current diagnosis for this admission?: Yes   


Plan: 


Lantus and sliding scale insulin








- Time


Time Spent with patient: 25-34 minutes


Total Critical Time (Minutes): 20


Medications reviewed and adjusted accordingly: Yes


Anticipated discharge: Home


Within: within 24 hours





- Inpatient Certification


Based on my medical assessment, after consideration of the patient's 

comorbidities, presenting symptoms, or acuity I expect that the services needed 

warrant INPATIENT care.: Yes


I certify that my determination is in accordance with my understanding of 

Medicare's requirements for reasonable and necessary INPATIENT services [42 CFR 

412.3e].: Yes


Medical Necessity: Failure to Improve With Outpatient Therapy, Need Close 

Monitoring Due to Risk of Patient Decompensation, Need for Neurological Checks, 

Risk of Complication if Not Cared For in Hospital

## 2018-07-23 VITALS — SYSTOLIC BLOOD PRESSURE: 138 MMHG | DIASTOLIC BLOOD PRESSURE: 67 MMHG

## 2018-07-23 RX ADMIN — Medication SCH ML: at 05:21

## 2018-07-23 RX ADMIN — LEVOFLOXACIN SCH MG: 750 TABLET, FILM COATED ORAL at 10:41

## 2018-07-23 RX ADMIN — ENOXAPARIN SODIUM SCH MG: 40 INJECTION SUBCUTANEOUS at 10:41

## 2018-07-23 RX ADMIN — GUAIFENESIN SCH MG: 600 TABLET, EXTENDED RELEASE ORAL at 10:41

## 2018-07-23 RX ADMIN — ASPIRIN SCH MG: 325 TABLET, DELAYED RELEASE ORAL at 10:41

## 2018-07-23 NOTE — RADIOLOGY REPORT (SQ)
EXAM DESCRIPTION:  CAROTID DOPPLER



COMPLETED DATE/TIME:  7/23/2018 10:54 am



REASON FOR STUDY:  Left arm weakness, slurred speech



COMPARISON:  None.



TECHNIQUE:  Grayscale ultrasound, Doppler velocity and spectra, and color Doppler images acquired of 
the extra-cranial carotid and vertebral arteries. Images stored on PACS.



LIMITATIONS:  Body habitus.  Motion.



FINDINGS:  RIGHT CAROTID

CCA Velocities: Within normal limits.

ICA Velocities

 Peak systolic 0.95 m/s.

 End diastolic 0.28 m/s.

Proximal ICA/CCA peak systolic ratio 1.3.

Spectra normal. No significant plaque.

LEFT CAROTID

CCA Velocities: Within normal limits.

ICA Velocities

 Peak systolic 0.74 m/s.

 End diastolic 0.16 m/s.

Proximal ICA/CCA peak systolic ratio 1.0.

Spectra normal. No significant plaque.

VERTEBRAL ARTERIES: Antegrade flow.  Normal waveforms.

SUBCLAVIAN ARTERIES: Not imaged.

OTHER: No other significant finding.



IMPRESSION:  NO HEMODYNAMICALLY SIGNIFICANT STENOSIS.



COMMENT:  Quality ID #195:  Velocity criteria are extrapolated from the diameter data as defined by t
he Society of Radiologists in Ultrasound Consensus Conference. Radiology 2003: 229; 340-346.



TECHNICAL DOCUMENTATION:  JOB ID:  [id number]

 2011 [employer]- All Rights Reserved



Reading location - IP/workstation name: [facility]

## 2018-07-23 NOTE — RADIOLOGY REPORT (SQ)
EXAM DESCRIPTION:  OTTO SWALLOW



COMPLETED DATE/TIME:  7/23/2018 8:48 am



REASON FOR STUDY:  Possible aspiration



COMPARISON:  None.



TECHNIQUE:  Videofluoroscopic swallowing examination was performed in conjunction with speech patholo
gy.  Videofluoroscopic imaging was obtained and reviewed and these are the findings:



RADIATION DOSE:  Fluoro time 2.58 minutes

1 images saved to PACS.



LIMITATIONS:  None



FINDINGS:  The patient was brought into the fluoro room and placed upright on a modified barium swall
ow chair.  The patient was then given multiple consistencies mixed with barium to swallow under live 
fluoroscopic video guidance.  According to the Speech Pathologist there was deep laryngeal penetratio
n seen with thin and nectar thick consistencies.  Trace aspiration was identified from the residuals.
 Please refer to the speech pathology report for further details.



IMPRESSION:  DEEP LARYNGEAL PENETRATION WITH THIN AND NECTAR THICK CONSISTENCIES, WITH TRACE ASPIRATI
ON SEEN FROM RESIDUALS.PLEASE SEE SPEECH PATHOLOGIST REPORT FOR OTHER FINDINGS AND RECOMMENDATIONS.



COMMENT:  NONE

Quality :  Final reports for procedures using fluoroscopy that document radiation exposure dalia
namita, or exposure time and number of fluorographic images (if radiation exposure indices are not avail
able)



TECHNICAL DOCUMENTATION:  JOB ID: [id number]

 2011 [employer]- All Rights Reserved



Reading location - IP/workstation name: [place] [id number]

## 2018-07-23 NOTE — ST INP MODIFIED BARIUM SWALLOW
Medical Diagnosis





- Medical Diagnoses


Medical Diagnosis Description & ICD-10 Code(s): CVA, Pneumonia, oral phase 

dysphagia





[facility] [place] [facility] [place]





- General


Date: 07/23/18





- History


History Obtained From: Other - EMR


-: Medical - per EMR: patient has medical history of glossal cancer status post 

resection and radiation treatment and chemo therapy x3 years ago, hypertension, 

diabetes melitus type 2, dyslipidemia. Patient admitted 7/21/18 due to left 

sided weakness and altered mental status. Reportedly has had cough x2 weeks


Medications: Medications Reviewed


Allergies: Refer to medical record





- Subjective


Current Nutritional Means: PO


Current PO Diet: Soft, Thickened liquids


Current Symptoms: Coughing, Pneumonia


Pain: Patient reports, 0/5





- Objective


Assessment: Upright, Left Lateral





- Food Trials


Food Trials Used: Thin liquids, Honey-thickened liquids, Nectar thick liquids, 

Pureed


The Patient: Was Able to Self Feed





- Assessment


Labial Function: Within Normal Limits


Lingual Function: Within Normal Limits





- Pharyngeal Stage


Initiation of Pharyngeal Stage: Normal


Decreased Laryngeal Elevation: Yes


Reduced Velo-Pharyngeal Closure: no


Reduced Pressure Generation: Yes


Pre-Swallowing Pooling in Valleculae: Moderate


Pre-Swallowing Pooling in Pyriforms: Moderate


Reduced Thyro-Hyiod Approximation: Yes


Reduced Epiglottic Excursion: Yes


Multiple Swallows With: Ineffective Clearance - required multiple dry swallows 

to clear residue


Post Swallow Residuals in Valleculae: Mild


Post Swallow Residuals in Pyriforms: Moderate


Post Swallow Residuals: throughout pharynx





- Esophageal Stage


Cricophageal Function: Normal





- Impression/Summary


Laryngeal Penetration: Yes, Deep, during swallow - with liquid trials, after 

swallow - on residue from thick and puree trials


Tracheal Aspiration: yes, after swallow


Productive Cough: Yes - needed cue to cough


Effective Clearing: partial clearing


Effective Compensatory Strategies: throat clear & reswallow


Patient Presents With: Pharyngeal stage dysph., Severe


Risk of Aspiration: Moderate


Risk Due To: Moderate to severe pharyngeal phase dysphagia. Patient 

demonstrated aspiration after the swallow on nectar and pudding trials due to 

residue entering airway. Penetration seen with thin liquids, able to redirect 

with throat clear. Mild penetration of honey thick liquid seen, with increased 

pharyngeal residue post swallow. Biggest risk of aspiration is from residue 

from thicker trials after the swallow entering airway. Discussed risk of 

aspiration and aspiration pneumonia iwth patient, and discussed possible need 

for alternative means for nutrition/hydration. Patient voiced understanding. 

Also discussed results with physician. Safest PO diet judged to be thin liquid 

and soft solids, however, patient still at risk of aspiration with this diet.





- Recommendations


Solid Diet Recommendations: Mechanical Soft


Liquid Diet Recommendations: Thin


Regular Diet: No


Strict Aspitarion Precautions: Yes


Dysphagia Therapy with SLP: Yes, Outpatient, Home Health


Recommended Techniques: Fully Upright During Meal, Small Bites and Sips, 

Alternate Bites/Sips


Other Recommendations: Would benefit from dysphagia therapy in outpaitent or 

home health setting to discuss compensatory strategies and dysphagia exercises.





- Time


Total Time: 30


Total Timed Minutes: 30

## 2018-07-23 NOTE — PDOC DISCHARGE SUMMARY
General





- Admit/Disc Date/PCP


Admission Date/Primary Care Provider: 


  07/21/18 18:08





  ROSA RENO MD





Discharge Date: 07/23/18





- Discharge Diagnosis


(1) CVA (cerebral vascular accident)


Is this a current diagnosis for this admission?: Yes   





(2) Essential hypertension


Is this a current diagnosis for this admission?: Yes   





(3) Decreased  strength of left hand


Is this a current diagnosis for this admission?: Yes   





(4) Pneumonia


Is this a current diagnosis for this admission?: Yes   





(5) Dysphagia


Is this a current diagnosis for this admission?: Yes   





(6) Left arm numbness


Is this a current diagnosis for this admission?: Yes   





(7) Diabetes mellitus


Is this a current diagnosis for this admission?: Yes   





- Additional Information


Resuscitation Status: Full Code


Discharge Diet: Regular, Other (Comments)


Discharge Activity: Activity As Tolerated


Prescriptions: 


Levofloxacin [Levaquin 750 mg Tablet] 750 mg PO DAILY #7 tablet


Home Medications: 








Amlodipine Besylate 2.5 mg PO DAILY 07/21/18 


Insulin Glargine,Hum.rec.anlog [Lantus Insulin 100 Unit/1 ml 10 ml] 25 unit 

SUBCUT QHS 07/21/18 


NPH, Human Insulin Isophane [Novolin N (NPH) Insulin 100 unit/mL] 15 unit 

SUBCUT ACLUNCH 07/21/18 


Olanzapine [Zyprexa] 20 mg PO QHS 07/21/18 


Omeprazole 20 mg PO DAILY 07/21/18 


Sertraline HCl 100 mg PO QHS 07/21/18 


Levofloxacin [Levaquin 750 mg Tablet] 750 mg PO DAILY #7 tablet 07/23/18 











History of Present Illness


Patient complains of: Left arm numbness and cough


History of Present Illness: 


DARÍO HARTMANN is a 73 year old male with past medical history of essential 

hypertension, dyslipidemia, diabetes mellitus type 2 on insulin and tongue 

cancer status post resection and XRT. Patient presents to [facility]'s emergency room this afternoon complaining of episode of dizziness 

yesterday at 10 AM in which he fell landing on his knee.  Patient since then 

has had left-sided weakness to the left upper extremity.  Patient states he has 

a decreased  to the left hand and has a decrease in sensation to the left 

upper arm.  Patient's family states that he had confusion after this episode 

yesterday that has gradually started to resolve and return to his normal 

baseline today.  Wife states that he does have some memory issues after 

treatment of tongue cancer with radiation.  Patient's wife states that his 

confusion yesterday was not normal for him.  Patient additionally has had a 

cough for the past 2 weeks.  Patient was placed on amoxicillin 5 days ago but 

only took it for 2 days and then refused to take any more.  Patient denies any 

chest pain, back pain headache or any dizzy symptoms at this time.  Patient did 

have an episode of vomiting yesterday when this episode occurred.  Patient's 

last known well was prior to 10 AM yesterday.











Hospital Course


Hospital Course: 


Patient was admitted to the [facility] on telemetry.  He had neuro checks and mends 

done frequently by the nursing staff.  He had no noted deficits other than the 

complaints of left arm numbness.  This resolved overnight.  Initial CT of the 

head showed no infarct.  Carotid duplex showed no signs of carotid stenosis.  

Patient refused MRI because of his claustrophobia despite offer of sedation.  

All symptoms had normalized except for his cough.  Chest x-ray showed a right 

middle lobe infiltrate.  He had been started on amoxicillin by his primary care 

provider the week prior but only took it for 2 days and then stopped due to 

constipation concerns.  He was started on oral Levaquin therapy and Mucinex.  

His cough gradually improved.  He was seen by speech therapy as well.  I 

suggested a modified barium swallow for dysphagia.  Patient had delayed 

swallowing during his study but no active aspiration.  Speech therapy has 

recommended considering PEG tube due to patient's history of glossal cancer and 

radiation to his neck.  Patient at this time thinks he is doing fine with his 

soft diet at home.  He will consider PEG in the future should he have further 

problems.  He will follow-up with his primary care provider in 1 week.  He was 

discharged home with his wife in good condition.





Physical Exam


Vital Signs: 


 











Temp Pulse Resp BP Pulse Ox


 


 98.2 F   66   18   149/75 H  97 


 


 07/23/18 12:04  07/23/18 12:04  07/23/18 12:04  07/23/18 12:04  07/23/18 12:04








 Intake & Output











 07/22/18 07/23/18 07/24/18





 06:59 06:59 06:59


 


Intake Total 20 682 


 


Balance 20 682 


 


Weight 68.1 kg 67.9 kg 











General appearance: PRESENT: no acute distress, thin, well-developed, well-

nourished


Head exam: PRESENT: atraumatic, normocephalic


Eye exam: PRESENT: conjunctiva pink, EOMI, PERRLA.  ABSENT: scleral icterus


Ear exam: PRESENT: normal external ear exam


Mouth exam: PRESENT: moist, tongue midline


Neck exam: PRESENT: carotid bruit


Respiratory exam: PRESENT: crackles - Right base, symmetrical, unlabored


Cardiovascular exam: PRESENT: RRR.  ABSENT: diastolic murmur, rubs, systolic 

murmur


Pulses: PRESENT: normal dorsalis pedis pul


Vascular exam: PRESENT: normal capillary refill


GI/Abdominal exam: PRESENT: normal bowel sounds, soft.  ABSENT: distended, 

guarding, mass, organolmegaly, rebound, tenderness


Rectal exam: PRESENT: deferred


Extremities exam: PRESENT: full ROM.  ABSENT: calf tenderness, clubbing, pedal 

edema


Musculoskeletal exam: PRESENT: ambulatory, full ROM, normal inspection


Neurological exam: PRESENT: alert, awake, oriented to person, oriented to place

, oriented to time, oriented to situation, CN II-XII grossly intact.  ABSENT: 

motor sensory deficit


Psychiatric exam: PRESENT: appropriate affect, normal mood.  ABSENT: homicidal 

ideation, suicidal ideation


Skin exam: PRESENT: dry, intact, warm.  ABSENT: cyanosis, rash





Results


Laboratory Results: 


 











  07/21/18 07/22/18 07/22/18





  19:30 01:20 06:35


 


Troponin I  < 0.012  < 0.012  < 0.012











Impressions: 


 





Chest X-Ray  07/21/18 15:01


IMPRESSION:  Right perihilar airspace opacity, may be secondary to pneumonia.  

Radiographic followup recommended to ensure complete resolution and exclude a 

different etiology.


 








Head CT  07/21/18 15:01


IMPRESSION:  No acute intracranial hemorrhage or acute territorial infarct.  

Mild chronic changes of atrophy and microvascular ischemia.


EVIDENCE OF ACUTE STROKE: NO.


 








Carotid Doppler Study  07/23/18 00:00


IMPRESSION:  NO HEMODYNAMICALLY SIGNIFICANT STENOSIS.


 








Modified Barium Swallow  07/23/18 00:00


IMPRESSION:  DEEP LARYNGEAL PENETRATION WITH THIN AND NECTAR THICK CONSISTENCIES

, WITH TRACE ASPIRATION SEEN FROM RESIDUALS.PLEASE SEE SPEECH PATHOLOGIST 

REPORT FOR OTHER FINDINGS AND RECOMMENDATIONS.


 














Qualifiers





- *


PATIENT BEING DISCHARGED WITH ANY OF THE FOLLOWING DIAGNOSIS: No





Plan


Discharge Plan: 


Home with wife


Time Spent: Less than 30 Minutes

## 2018-10-23 ENCOUNTER — HOSPITAL ENCOUNTER (OUTPATIENT)
Dept: HOSPITAL 62 - RAD | Age: 73
End: 2018-10-23
Attending: INTERNAL MEDICINE
Payer: MEDICARE

## 2018-10-23 DIAGNOSIS — K44.9: ICD-10-CM

## 2018-10-23 DIAGNOSIS — K22.2: Primary | ICD-10-CM

## 2018-10-23 DIAGNOSIS — Z85.818: ICD-10-CM

## 2018-10-23 DIAGNOSIS — R13.12: ICD-10-CM

## 2018-10-23 PROCEDURE — 74220 X-RAY XM ESOPHAGUS 1CNTRST: CPT

## 2018-10-23 NOTE — RADIOLOGY REPORT (SQ)
EXAM DESCRIPTION:  BARIUM SWALLOW ESOPHAGUS



COMPLETED DATE/TIME:  10/23/2018 9:55 am



REASON FOR STUDY:  DYSPHAGIA (R13.12), PERSONAL HX OF MALIGNANT NEOPLASM OF OTHER SITES OF LIP R13.12
  DYSPHAGIA, OROPHARYNGEAL PHASE Z85.818  PRSNL HX OF MALIG NEOPLM OF SITE OF LIP, ORAL CAV,



COMPARISON:  Video assisted speech pathology swallow study 7/23/2018



TECHNIQUE:  Under fluoroscopic guidance, patient ingested effervescent granules followed by thick and
 thin barium. Fluoroscopic spot images and routine radiographic images acquired and stored on PACS.

12 MM BARIUM TABLET GIVEN: Yes.

The 12 mm barium tablet paused in the patient's upper esophagus at the level of C4-5, just dorsal to 
laryngeal structures.  Tablet persistent for 5 minutes in this location before passing through into t
he remainder the esophagus and stomach.  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 
 Mild upper esophageal stricture related to radiation therapy is suspected.



LIMITATIONS:  None.



FLUOROSCOPY TIME:  FLUORO TIME: 3.4 minutes

14 series of digital images saved to PACS.



FINDINGS:  NEUROMUSCULAR COORDINATION OF SWALLOW: Poor control of the oral bolus of thick barium with
 aspiration of liquid barium into the trachea.  Minimal cough response.

ESOPHAGEAL MOTILITY: Normal peristalsis. No esophageal spasm.  Fixed narrowing in the upper cervical 
esophagus at the level of C4-5, just dorsal to the laryngeal structures.  Swallowed tablet persisted 
in this location 5 minutes before passing through into the remainder the esophagus and stomach

ESOPHAGEAL MUCOSA: There is no distal esophageal mucosal irregularity just above a small hiatal herni
a.  King's esophagus could not be excluded

GASTRO-ESOPHAGEAL JUNCTION: Small hiatal hernia.  No distal esophageal stricture.  Mild distal esopha
geal mucosal irregularity could represent King's esophagus.

NON-GI TRACT STRUCTURES: Patient aspirated barium into the trachea throughout the study.  Cervical de
generative disc changes with mild anterior cervical osteophyte formation.

OTHER: No other significant finding.



IMPRESSION:  Poor control of the oral bolus of thick barium with aspiration of liquid barium into the
 trachea.  Minimal cough response.

Upper esophageal stricture at the level of C4-5/larynx.  This could be radiation induced stricture.  
Patient gives a history of laryngeal malignancy with radiotherapy treatment

Hiatal hernia with mild distal esophageal mucosal irregularity worrisome for King's esophagus



COMMENT:  Quality :  Final reports for procedures using fluoroscopy that document radiation exp
osure indices, or exposure time and number of fluorographic images (if radiation exposure indices are
 not available)



TECHNICAL DOCUMENTATION:  JOB ID:  [id number]

 2011 [employer]- All Rights Reserved



Reading location - IP/workstation name: [email or web address]

## 2020-01-03 ENCOUNTER — HOSPITAL ENCOUNTER (OUTPATIENT)
Dept: HOSPITAL 62 - II | Age: 75
Discharge: HOME | End: 2020-01-03
Attending: INTERNAL MEDICINE
Payer: MEDICARE

## 2020-01-03 VITALS — DIASTOLIC BLOOD PRESSURE: 57 MMHG | SYSTOLIC BLOOD PRESSURE: 137 MMHG

## 2020-01-03 DIAGNOSIS — D69.6: Primary | ICD-10-CM

## 2020-01-03 LAB
ERYTHROCYTE [DISTWIDTH] IN BLOOD BY AUTOMATED COUNT: 15.4 % (ref 11.5–14)
HCT VFR BLD CALC: 20 % (ref 37.9–51)
HGB BLD-MCNC: 7.2 G/DL (ref 13.5–17)
MCH RBC QN AUTO: 31.6 PG (ref 27–33.4)
MCHC RBC AUTO-ENTMCNC: 36.1 G/DL (ref 32–36)
MCV RBC AUTO: 88 FL (ref 80–97)
PLATELET # BLD: 39 10^3/UL (ref 150–450)
RBC # BLD AUTO: 2.28 10^6/UL (ref 4.35–5.55)
WBC # BLD AUTO: 0.8 10^3/UL (ref 4–10.5)

## 2020-01-03 PROCEDURE — P9035 PLATELET PHERES LEUKOREDUCED: HCPCS

## 2020-01-03 PROCEDURE — 86901 BLOOD TYPING SEROLOGIC RH(D): CPT

## 2020-01-03 PROCEDURE — 86900 BLOOD TYPING SEROLOGIC ABO: CPT

## 2020-01-03 PROCEDURE — 36430 TRANSFUSION BLD/BLD COMPNT: CPT

## 2020-04-25 ENCOUNTER — HOSPITAL ENCOUNTER (INPATIENT)
Dept: HOSPITAL 62 - ER | Age: 75
LOS: 5 days | Discharge: ANOTHER HOSPITAL | DRG: 808 | End: 2020-04-30
Attending: FAMILY MEDICINE | Admitting: INTERNAL MEDICINE
Payer: MEDICARE

## 2020-04-25 DIAGNOSIS — D61.810: Primary | ICD-10-CM

## 2020-04-25 DIAGNOSIS — Z87.01: ICD-10-CM

## 2020-04-25 DIAGNOSIS — Z87.891: ICD-10-CM

## 2020-04-25 DIAGNOSIS — E87.1: ICD-10-CM

## 2020-04-25 DIAGNOSIS — C92.62: ICD-10-CM

## 2020-04-25 DIAGNOSIS — E87.6: ICD-10-CM

## 2020-04-25 DIAGNOSIS — R13.10: ICD-10-CM

## 2020-04-25 DIAGNOSIS — J96.01: ICD-10-CM

## 2020-04-25 DIAGNOSIS — Z79.4: ICD-10-CM

## 2020-04-25 DIAGNOSIS — R78.81: ICD-10-CM

## 2020-04-25 DIAGNOSIS — Z82.49: ICD-10-CM

## 2020-04-25 DIAGNOSIS — Z85.89: ICD-10-CM

## 2020-04-25 DIAGNOSIS — Z86.73: ICD-10-CM

## 2020-04-25 DIAGNOSIS — E11.9: ICD-10-CM

## 2020-04-25 DIAGNOSIS — C92.02: ICD-10-CM

## 2020-04-25 DIAGNOSIS — Z88.6: ICD-10-CM

## 2020-04-25 DIAGNOSIS — L02.414: ICD-10-CM

## 2020-04-25 DIAGNOSIS — R50.81: ICD-10-CM

## 2020-04-25 DIAGNOSIS — C92.A2: ICD-10-CM

## 2020-04-25 DIAGNOSIS — Z79.899: ICD-10-CM

## 2020-04-25 DIAGNOSIS — Z83.3: ICD-10-CM

## 2020-04-25 DIAGNOSIS — F43.10: ICD-10-CM

## 2020-04-25 DIAGNOSIS — J15.1: ICD-10-CM

## 2020-04-25 DIAGNOSIS — R04.2: ICD-10-CM

## 2020-04-25 DIAGNOSIS — M19.90: ICD-10-CM

## 2020-04-25 DIAGNOSIS — F31.9: ICD-10-CM

## 2020-04-25 DIAGNOSIS — I10: ICD-10-CM

## 2020-04-25 DIAGNOSIS — Z88.1: ICD-10-CM

## 2020-04-25 DIAGNOSIS — B96.5: ICD-10-CM

## 2020-04-25 LAB
A TYPE INFLUENZA AG: NEGATIVE
ADD MANUAL DIFF: (no result)
ALBUMIN SERPL-MCNC: 4 G/DL (ref 3.5–5)
ALP SERPL-CCNC: 87 U/L (ref 38–126)
ANION GAP SERPL CALC-SCNC: 12 MMOL/L (ref 5–19)
ANION GAP SERPL CALC-SCNC: 16 MMOL/L (ref 5–19)
ANISOCYTOSIS BLD QL SMEAR: (no result)
APPEARANCE UR: CLEAR
APTT PPP: YELLOW S
AST SERPL-CCNC: 18 U/L (ref 17–59)
B INFLUENZA AG: NEGATIVE
BASE EXCESS BLDV CALC-SCNC: -1.7 MMOL/L
BASOPHILS # BLD AUTO: 0 10^3/UL (ref 0–0.2)
BASOPHILS NFR BLD AUTO: 0 % (ref 0–2)
BILIRUB DIRECT SERPL-MCNC: 0.1 MG/DL (ref 0–0.4)
BILIRUB SERPL-MCNC: 1.6 MG/DL (ref 0.2–1.3)
BILIRUB UR QL STRIP: NEGATIVE
BUN SERPL-MCNC: 23 MG/DL (ref 7–20)
BUN SERPL-MCNC: 24 MG/DL (ref 7–20)
CALCIUM: 8.7 MG/DL (ref 8.4–10.2)
CALCIUM: 8.9 MG/DL (ref 8.4–10.2)
CHLORIDE SERPL-SCNC: 96 MMOL/L (ref 98–107)
CHLORIDE SERPL-SCNC: 99 MMOL/L (ref 98–107)
CO2 SERPL-SCNC: 17 MMOL/L (ref 22–30)
CO2 SERPL-SCNC: 22 MMOL/L (ref 22–30)
DACRYOCYTES BLD QL SMEAR: SLIGHT
EOSINOPHIL # BLD AUTO: 0 10^3/UL (ref 0–0.6)
EOSINOPHIL NFR BLD AUTO: 4.2 % (ref 0–6)
ERYTHROCYTE [DISTWIDTH] IN BLOOD BY AUTOMATED COUNT: 16.7 % (ref 11.5–14)
GLUCOSE SERPL-MCNC: 284 MG/DL (ref 75–110)
GLUCOSE SERPL-MCNC: 312 MG/DL (ref 75–110)
GLUCOSE UR STRIP-MCNC: 50 MG/DL
HCO3 BLDV-SCNC: 22.6 MMOL/L (ref 20–32)
HCT VFR BLD CALC: 23.8 % (ref 37.9–51)
HGB BLD-MCNC: 8.6 G/DL (ref 13.5–17)
INR PPP: 1.23
KETONES UR STRIP-MCNC: NEGATIVE MG/DL
LYMPHOCYTES # BLD AUTO: 0.1 10^3/UL (ref 0.5–4.7)
LYMPHOCYTES NFR BLD AUTO: 71.6 % (ref 13–45)
MACROCYTES BLD QL SMEAR: (no result)
MCH RBC QN AUTO: 36.9 PG (ref 27–33.4)
MCHC RBC AUTO-ENTMCNC: 36.3 G/DL (ref 32–36)
MCV RBC AUTO: 102 FL (ref 80–97)
MONOCYTES # BLD AUTO: 0 10^3/UL (ref 0.1–1.4)
MONOCYTES NFR BLD AUTO: 6.7 % (ref 3–13)
NEUTROPHILS # BLD AUTO: 0 10^3/UL (ref 1.7–8.2)
NEUTS SEG NFR BLD AUTO: 17.5 % (ref 42–78)
OVALOCYTES BLD QL SMEAR: SLIGHT
PCO2 BLDV: 36.8 MMHG (ref 35–63)
PH BLDV: 7.41 [PH] (ref 7.3–7.42)
PH UR STRIP: 6 [PH] (ref 5–9)
PLATELET # BLD: 28 10^3/UL (ref 150–450)
PLATELET COMMENT: (no result)
POIKILOCYTOSIS BLD QL SMEAR: (no result)
POTASSIUM SERPL-SCNC: 3.8 MMOL/L (ref 3.6–5)
POTASSIUM SERPL-SCNC: 3.9 MMOL/L (ref 3.6–5)
PROT SERPL-MCNC: 7.7 G/DL (ref 6.3–8.2)
PROT UR STRIP-MCNC: NEGATIVE MG/DL
PROTHROMBIN TIME: 15.6 SEC (ref 11.4–15.4)
RBC # BLD AUTO: 2.34 10^6/UL (ref 4.35–5.55)
SP GR UR STRIP: 1.01
TOTAL CELLS COUNTED % (AUTO): 100 %
UROBILINOGEN UR-MCNC: NEGATIVE MG/DL (ref ?–2)
WBC # BLD AUTO: 0.1 10^3/UL (ref 4–10.5)

## 2020-04-25 PROCEDURE — 81001 URINALYSIS AUTO W/SCOPE: CPT

## 2020-04-25 PROCEDURE — 87635 SARS-COV-2 COVID-19 AMP PRB: CPT

## 2020-04-25 PROCEDURE — 99285 EMERGENCY DEPT VISIT HI MDM: CPT

## 2020-04-25 PROCEDURE — 86920 COMPATIBILITY TEST SPIN: CPT

## 2020-04-25 PROCEDURE — 82962 GLUCOSE BLOOD TEST: CPT

## 2020-04-25 PROCEDURE — 99291 CRITICAL CARE FIRST HOUR: CPT

## 2020-04-25 PROCEDURE — 71045 X-RAY EXAM CHEST 1 VIEW: CPT

## 2020-04-25 PROCEDURE — 82803 BLOOD GASES ANY COMBINATION: CPT

## 2020-04-25 PROCEDURE — 86901 BLOOD TYPING SEROLOGIC RH(D): CPT

## 2020-04-25 PROCEDURE — 85610 PROTHROMBIN TIME: CPT

## 2020-04-25 PROCEDURE — 36415 COLL VENOUS BLD VENIPUNCTURE: CPT

## 2020-04-25 PROCEDURE — 80053 COMPREHEN METABOLIC PANEL: CPT

## 2020-04-25 PROCEDURE — 86850 RBC ANTIBODY SCREEN: CPT

## 2020-04-25 PROCEDURE — 87077 CULTURE AEROBIC IDENTIFY: CPT

## 2020-04-25 PROCEDURE — 74340 X-RAY GUIDE FOR GI TUBE: CPT

## 2020-04-25 PROCEDURE — 87804 INFLUENZA ASSAY W/OPTIC: CPT

## 2020-04-25 PROCEDURE — 85025 COMPLETE CBC W/AUTO DIFF WBC: CPT

## 2020-04-25 PROCEDURE — 87070 CULTURE OTHR SPECIMN AEROBIC: CPT

## 2020-04-25 PROCEDURE — 44500 INTRO GASTROINTESTINAL TUBE: CPT

## 2020-04-25 PROCEDURE — 71260 CT THORAX DX C+: CPT

## 2020-04-25 PROCEDURE — 36430 TRANSFUSION BLD/BLD COMPNT: CPT

## 2020-04-25 PROCEDURE — 74177 CT ABD & PELVIS W/CONTRAST: CPT

## 2020-04-25 PROCEDURE — 87040 BLOOD CULTURE FOR BACTERIA: CPT

## 2020-04-25 PROCEDURE — 87150 DNA/RNA AMPLIFIED PROBE: CPT

## 2020-04-25 PROCEDURE — 84145 PROCALCITONIN (PCT): CPT

## 2020-04-25 PROCEDURE — 80202 ASSAY OF VANCOMYCIN: CPT

## 2020-04-25 PROCEDURE — 87205 SMEAR GRAM STAIN: CPT

## 2020-04-25 PROCEDURE — 30233R1 TRANSFUSION OF NONAUTOLOGOUS PLATELETS INTO PERIPHERAL VEIN, PERCUTANEOUS APPROACH: ICD-10-PCS | Performed by: INTERNAL MEDICINE

## 2020-04-25 PROCEDURE — 83735 ASSAY OF MAGNESIUM: CPT

## 2020-04-25 PROCEDURE — 96365 THER/PROPH/DIAG IV INF INIT: CPT

## 2020-04-25 PROCEDURE — P9016 RBC LEUKOCYTES REDUCED: HCPCS

## 2020-04-25 PROCEDURE — 86900 BLOOD TYPING SEROLOGIC ABO: CPT

## 2020-04-25 PROCEDURE — 83605 ASSAY OF LACTIC ACID: CPT

## 2020-04-25 PROCEDURE — 96361 HYDRATE IV INFUSION ADD-ON: CPT

## 2020-04-25 PROCEDURE — 83690 ASSAY OF LIPASE: CPT

## 2020-04-25 PROCEDURE — 87186 SC STD MICRODIL/AGAR DIL: CPT

## 2020-04-25 PROCEDURE — P9035 PLATELET PHERES LEUKOREDUCED: HCPCS

## 2020-04-25 PROCEDURE — 74230 X-RAY XM SWLNG FUNCJ C+: CPT

## 2020-04-25 RX ADMIN — CEFEPIME SCH MLS/HR: 2 INJECTION, POWDER, FOR SOLUTION INTRAMUSCULAR; INTRAVENOUS at 21:50

## 2020-04-25 RX ADMIN — INSULIN LISPRO SCH UNIT: 100 INJECTION, SOLUTION INTRAVENOUS; SUBCUTANEOUS at 21:50

## 2020-04-25 RX ADMIN — INSULIN LISPRO SCH: 100 INJECTION, SOLUTION INTRAVENOUS; SUBCUTANEOUS at 18:07

## 2020-04-25 RX ADMIN — SERTRALINE HYDROCHLORIDE SCH MG: 50 TABLET ORAL at 21:50

## 2020-04-25 RX ADMIN — OLANZAPINE SCH MG: 5 TABLET, FILM COATED ORAL at 21:50

## 2020-04-25 RX ADMIN — INSULIN GLARGINE SCH UNIT: 100 INJECTION, SOLUTION SUBCUTANEOUS at 18:06

## 2020-04-25 NOTE — RADIOLOGY REPORT (SQ)
EXAM DESCRIPTION:  CT ABD/PELVIS WITH IV ONLY



IMAGES COMPLETED DATE/TIME:  4/25/2020 4:06 pm



REASON FOR STUDY:  sepsis unknown source



COMPARISON:  None.



TECHNIQUE:  CT scan of the abdomen and pelvis performed using helical scanning technique with dynamic
 intravenous contrast injection.  No oral contrast. Images reviewed with lung, soft tissue, and bone 
windows. Reconstructed coronal and sagittal MPR images reviewed. Delayed images for evaluation of the
 urinary system also acquired. All images stored on PACS.

All CT scanners at this facility use dose modulation, iterative reconstruction, and/or weight based d
osing when appropriate to reduce radiation dose to as low as reasonably achievable (ALARA).

[facility]: Dose Right  [facility]: CareDose    [facility]: Dose Right    [facility]: Teradose 4D    [facility]: Smart Technologies



CONTRAST TYPE AND DOSE:  contrast/concentration: Isovue 350.00 mg/ml; Total Contrast Delivered: 73.0 
ml; Total Saline Delivered: 66.0 ml



RENAL FUNCTION:  BUN 24 creatinine 1.02.



RADIATION DOSE:  CT Rad equipment meets quality standard of care and radiation dose reduction techniq
ues were employed. CTDIvol: 6.3 - 9.1 mGy. DLP: 959 mGy-cm..



LIMITATIONS:  None.



FINDINGS:  LOWER CHEST: See separate report of the CT of the chest.

LIVER: Normal size. No masses.  No dilated ducts.

SPLEEN: Normal size. No focal lesions.

PANCREAS: No masses. No significant calcifications. No adjacent inflammation or peripancreatic fluid 
collections. Pancreatic duct not dilated.

GALLBLADDER: Surgically absent.

ADRENAL GLANDS: No significant masses or asymmetry.

RIGHT KIDNEY AND URETER: No solid masses.   No significant calcifications.   No hydronephrosis or hyd
roureter.

LEFT KIDNEY AND URETER: No solid masses.   No significant calcifications.   No hydronephrosis or hydr
oureter.

AORTA AND VESSELS: No aneurysm. No dissection. Renal arteries, SMA, celiac without stenosis.

RETROPERITONEUM: No retroperitoneal adenopathy, hemorrhage or masses.

BOWEL AND PERITONEAL CAVITY: No masses or inflammatory changes. No free fluid or peritoneal masses.

APPENDIX: Normal.

PELVIS: No mass.  No free fluid. Normal bladder.

ABDOMINAL WALL: No masses. No hernias.

BONES: No significant or acute findings.

OTHER: No other significant finding.



IMPRESSION:  NO SIGNIFICANT OR ACUTE FINDING IN THE ABDOMEN OR PELVIS ON CT SCAN WITH IV CONTRAST.



TECHNICAL DOCUMENTATION:  JOB ID:  [id number]

Quality ID # [id number]: Final reports with documentation of one or more dose reduction techniques (e.g., Au
tomated exposure control, adjustment of the mA and/or kV according to patient size, use of iterative 
reconstruction technique)

 2011 [employer]- All Rights Reserved



Reading location - IP/workstation name: [email or web address]

## 2020-04-25 NOTE — PDOC H&P
History of Present Illness


Admission Date/PCP: 


  04/25/20 14:46





  MARILYN EL MD





History of Present Illness: 


DARÍO HARTMANN is a 75 year old male past medical history significant for le

ukemia on chemotherapy, head/neck cancer treated with radiation in the past, T2 

DM, history of CVA, HTN who presents with a 3 to 4-day history of progressive 

productive cough of white sputum and fevers greater than 102 degrees.  Patient 

states he has never had this reaction to his chemotherapy in the past.  His last

dose of chemotherapy was 1 week PTA.  Patient is followed with local oncology 

group and they have been notified of the patient's admission, will follow along 

here while patient is admitted.  Chest x-ray unremarkable.  WBC down to 0.1, 

hemoglobin down to 8.6, thrombocytopenia down to 28.  Urinalysis was not done at

time of admission as patient had not urinated yet.  He was started on IV fluids 

and broad-spectrum antibiotics arranged pending fever.  Admitted to general 

medicine floor.  Neutropenic diet.  Necessity and timing of bone marrow 

stimulant such as Granix will be up to hematology.








Past Medical History


Cardiac Medical History: Reports: Hypertension


Pulmonary Medical History: Reports: Pneumonia


Endocrine Medical History: Reports: Diabetes Mellitus Type 2


Malignancy Medical History: Reports: Leukemia, Other - Head and neck cancer


Musculoskeltal Medical History: Reports: Arthritis


Psychiatric Medical History: Reports: Bipolar Disorder, Depression, Post 

Traumatic Stress Disorder


Hematology: Reports: Anemia





Past Surgical History


Past Surgical History: Reports: Cholecystectomy, Other - Glossal resection  PEG 

tube insertion





Social History


Information Source: Patient, Emergency Med Personnel, [facility] Records


Lives with: Family, Spouse/Significant other


Smoking Status: Former Smoker


Electronic Cigarette use?: No


Frequency of Alcohol Use: Rare


Hx Recreational Drug Use: No


Drugs: None


Hx Prescription Drug Abuse: No





- Advance Directive


Resuscitation Status: Do Not Resuscitate


Surrogate healthcare decision maker:: 





Wife





Family History


Family History: DM, Hypertension


Parental Family History Reviewed: Yes


Children Family History Reviewed: Yes


Sibling(s) Family History Reviewed.: Yes





Medication/Allergy


Home Medications: 








Olanzapine [Zyprexa] 20 mg PO QHS 07/21/18 


Sertraline HCl 100 mg PO QHS 07/21/18 


Cefdinir 300 mg PO BID 04/25/20 


Insulin Glargine,Hum.rec.anlog [Lantus Insulin 100 Unit/mL Insulin Pen] 8 units 

IJ QPM 04/25/20 


Insulin Lispro [Humalog Kwikpen] 16 unit SQ ASDIR PRN 04/25/20 


Valacyclovir HCl [Valacyclovir] 500 mg PO DAILY 04/25/20 








Allergies/Adverse Reactions: 


                                        





morphine Allergy (Verified 04/25/20 12:57)


   











Review of Systems


All systems: reviewed and no additional remarkable complaints except as stated -

 Per HPI otherwise negative





Physical Exam


Vital Signs: 


                                        











Temp Pulse Resp BP Pulse Ox


 


 98.5 F      18   147/66 H  99 


 


 04/25/20 15:07     04/25/20 15:01  04/25/20 15:01  04/25/20 15:01








                                 Intake & Output











 04/24/20 04/25/20 04/26/20





 06:59 06:59 06:59


 


Intake Total   1100


 


Balance   1100


 


Weight   63.9 kg











General appearance: PRESENT: no acute distress, well-developed, well-nourished


Head exam: PRESENT: atraumatic, normocephalic


Eye exam: PRESENT: conjunctiva pink


Mouth exam: PRESENT: dry mucosa


Respiratory exam: PRESENT: clear to auscultation mary.  ABSENT: rales, rhonchi, 

wheezes


Cardiovascular exam: PRESENT: RRR.  ABSENT: diastolic murmur, rubs, systolic m

urmur


GI/Abdominal exam: PRESENT: distended, normal bowel sounds, soft, tenderness.  

ABSENT: guarding, mass, organolmegaly, rebound


Rectal exam: PRESENT: deferred


Neurological exam: PRESENT: alert, awake, oriented to person, oriented to place,

 oriented to time, oriented to situation, CN II-XII grossly intact.  ABSENT: 

motor sensory deficit


Psychiatric exam: PRESENT: appropriate affect, normal mood


Skin exam: PRESENT: dry, intact, warm





Results


Laboratory Results: 


                                        





                                 04/25/20 12:50 





                                 04/25/20 12:50 





                                        











  04/25/20 04/25/20 04/25/20





  12:50 12:50 12:50


 


WBC  0.1 L*  


 


RBC  2.34 L  


 


Hgb  8.6 L  


 


Hct  23.8 L  


 


MCV  102 H  


 


MCH  36.9 H  


 


MCHC  36.3 H  


 


RDW  16.7 H  


 


Plt Count  28 L*  


 


Seg Neutrophils %  17.5 L  


 


VBG pH    7.41


 


VBG pCO2    36.8


 


VBG HCO3    22.6


 


VBG Base Excess    -1.7


 


Sodium   130.0 L 


 


Potassium   3.9 


 


Chloride   96 L 


 


Carbon Dioxide   22 


 


Anion Gap   12 


 


BUN   24 H 


 


Creatinine   1.02 


 


Est GFR ( Amer)   > 60 


 


Glucose   312 H 


 


Lactic Acid   


 


Calcium   8.9 


 


Total Bilirubin   1.6 H 


 


AST   18 


 


Alkaline Phosphatase   87 


 


Total Protein   7.7 


 


Albumin   4.0 


 


Urine Color   


 


Urine Appearance   


 


Urine pH   


 


Ur Specific Gravity   


 


Urine Protein   


 


Urine Glucose (UA)   


 


Urine Ketones   


 


Urine Blood   


 


Urine RBC (Auto)   














  04/25/20 04/25/20





  12:50 13:21


 


WBC  


 


RBC  


 


Hgb  


 


Hct  


 


MCV  


 


MCH  


 


MCHC  


 


RDW  


 


Plt Count  


 


Seg Neutrophils %  


 


VBG pH  


 


VBG pCO2  


 


VBG HCO3  


 


VBG Base Excess  


 


Sodium  


 


Potassium  


 


Chloride  


 


Carbon Dioxide  


 


Anion Gap  


 


BUN  


 


Creatinine  


 


Est GFR (African Amer)  


 


Glucose  


 


Lactic Acid  1.9 


 


Calcium  


 


Total Bilirubin  


 


AST  


 


Alkaline Phosphatase  


 


Total Protein  


 


Albumin  


 


Urine Color   YELLOW


 


Urine Appearance   CLEAR


 


Urine pH   6.0


 


Ur Specific Hardy   1.011


 


Urine Protein   NEGATIVE


 


Urine Glucose (UA)   50 H


 


Urine Ketones   NEGATIVE


 


Urine Blood   NEGATIVE


 


Urine RBC (Auto)   0











Impressions: 


                                        





Chest X-Ray  04/25/20 12:48


IMPRESSION:  NO ACUTE RADIOGRAPHIC FINDING IN THE CHEST.


 














Assessment and Plan





- Diagnosis


(1) Neutropenic fever


Is this a current diagnosis for this admission?: Yes   


Plan: 





Fever up to 102, tachycardic, unclear source on admission


Vancomycin/cefepime started empirically on admission


Blood cultures


Sputum culture


UA with reflex culture








(2) Leukemia


Is this a current diagnosis for this admission?: Yes   


Plan: 





Unknown type of leukemia as nothing is documented in the chart


Currently undergoing chemotherapy per patient for the last 5 to 6 months, last 

chemo was 1 week PTA


Oncology consulted on admission, defer to them for any needs for bone marrow 

stimulant such as Granix








(3) T2DM (type 2 diabetes mellitus)


Qualifiers: 


   Diabetes mellitus long term insulin use: with long term use   Diabetes 

mellitus complication status: without complication   Qualified Code(s): E11.9 - 

Type 2 diabetes mellitus without complications; Z79.4 - Long term (current) use 

of insulin   


Is this a current diagnosis for this admission?: Yes   


Plan: 





Takes 60 units of Lantus nightly and 8 units of lispro 3 times daily AC


Restart home Lantus, sliding scale insulin, Accu-Cheks








(4) HTN (hypertension)


Is this a current diagnosis for this admission?: Yes   


Plan: 





Not currently taking any medications for this at home








(5) Dysphagia


Is this a current diagnosis for this admission?: Yes   


Plan: 





Due to head and neck cancer treatment with radiation and surgery


Possible source of aspiration


Speech therapy








(6) Pancytopenia


Is this a current diagnosis for this admission?: Yes   


Plan: 





Due to chemotherapy


As above








(7) Hyponatremia


Is this a current diagnosis for this admission?: Yes   


Plan: 





Unclear if acute or chronic as lab values and EMR are limited to 2 years ago


IV fluids


Trend BMP








(8) History of head and neck cancer


Is this a current diagnosis for this admission?: Yes   





- Time


Time Spent with patient: 35 or more minutes


Medications reviewed and adjusted accordingly: Yes





- Inpatient Certification


Medical Necessity: Significant Comorbidiites Make Outpatient Treatment Too 

Risky, Need Close Monitoring Due to Risk of Patient Decompensation, Need for IV 

Antibiotics, Risk of Complication if Not Cared For in Hospital, Risk of 

Diagnosis Which Will Require Inpatient Eval/Care/Monitoring

## 2020-04-25 NOTE — ER DOCUMENT REPORT
ED General





- General


Chief Complaint: Fever


Stated Complaint: FEVER


Time Seen by Provider: 04/25/20 12:33


Primary Care Provider: 


MARILYN EL MD [Primary Care Provider] - Follow up as needed


Notes: 





CHIEF COMPLAINT: Cough for 3 days, fever today





HPI: 75-year-old male with history of throat cancer,  leukemia who is undergoing

chemotherapy treatment currently presenting for dry cough for 3 days with onset 

of fever today.  Denies abdominal pain nausea vomiting.  Patient states he had 

chemotherapy last week.  He states he gets chemotherapy every 3 weeks.  Patient 

is unable to tell me what medications he receives or how long he has been on 

chemotherapy.  Patient denies chest pain.  He denies shortness of breath.  He 

complains of generalized weakness.  He denies dysuria





ROS: See HPI - all other systems were reviewed and are otherwise negative


Constitutional: Positive fever 


Eyes: no drainage, no blurred vision


ENT: no runny nose, no sore throat


Cardiovascular:  no chest pain 


Resp: no SOB, positive cough


GI: no vomiting, no diarrhea, no abdominal pain


: no dysuria


Integumentary: no rash 


Allergy: no hives 


Musculoskeletal: no extremity pain or swelling 


Neurological: no numbness/tingling, positive generalized weakness





MEDICATIONS: I agree with the patient medications as charted by the RN.





ALLERGIES: I agree with the allergies as charted by the RN.





PAST MEDICAL HISTORY/PAST SURGICAL HISTORY: Reviewed and agree as charted by RN.





SOCIAL HISTORY: Reviewed and agree as charted by RN.





FAMILY HISTORY: No significant familial comorbid conditions directly related to 

patient complaint





EXAM:


Reviewed vital signs as charted by RN.


CONSTITUTIONAL: Alert and oriented and responds appropriately to questions. 

Well-appearing; well-nourished, mild distress secondary to fever


HEAD: Normocephalic; atraumatic


EYES: PERRL; Conjunctivae clear, sclerae non-icteric


ENT: normal nose; no rhinorrhea; moist mucous membranes; pharynx without lesions

noted, no uvula edema or deviation, no tonsillar hypertrophy, phonation normal


NECK: Supple without meningismus; non-tender; no cervical lymphadenopathy, no 

masses


CARD: RRR; no murmurs, no clicks, no rubs, no gallops; symmetric distal pulses


RESP: Normal chest excursion without splinting or tachypnea; breath sounds clear

and equal bilaterally; no wheezes, no rhonchi, no rales, pulse oximetry 97% on 

room air not hypoxic


ABD/GI: Normal bowel sounds; non-distended; soft, non-tender, no rebound, no 

guarding; no palpable organomegaly or masses.


BACK:  The back appears normal and is non-tender to palpation, there is no CVA 

tenderness


EXT: Normal ROM in all joints; non-tender to palpation; no cyanosis, no 

effusions, no edema   


SKIN: Normal color for age and race; warm; dry; good turgor; no acute lesions 

noted


NEURO: Moves all extremities equally; Motor and sensory function intact 


PSYCH: The patient's mood and manner are appropriate. Grooming and personal 

hygiene are appropriate.





MDM: 75-year-old male brought for evaluation of generalized weakness, 3 days of 

dry cough with onset of a fever today.  Undergoing chemotherapy currently.  

Follows with Dr. El.  Patient does come in with a CBC that he states was 

drawn yesterday which shows a WBC count of 0.4 indicating neutropenia in this 

patient, will place on neutropenic precautions, obtain blood culture urine 

culture chest x-ray screening labs.  Will likely need admission


TRAVEL OUTSIDE OF THE [place] IN LAST 30 DAYS: No





- Related Data


Allergies/Adverse Reactions: 


                                        





morphine Allergy (Verified 04/25/20 12:57)


   











Past Medical History





- Social History


Smoking Status: Unknown if Ever Smoked


Family History: DM, Hypertension





- Past Medical History


Cardiac Medical History: Reports: Hx Hypertension


Endocrine Medical History: Reports: Hx Diabetes Mellitus Type 2


Renal/ Medical History: Denies: Hx Peritoneal Dialysis


Musculoskeletal Medical History: Reports Hx Arthritis


Psychiatric Medical History: Reports: Hx Bipolar Disorder, Hx Depression, Hx 

Post Traumatic Stress Disorder


Past Surgical History: Reports: Hx Abdominal Surgery - G TUBE, Hx 

Cholecystectomy, Other - Glossal resection  PEG tube insertion





- Immunizations


Immunizations up to date: Yes


Hx Diphtheria, Pertussis, Tetanus Vaccination: Yes





Physical Exam





- Vital signs


Vitals: 


                                        











Temp Pulse Ox


 


 101.6 F H  99 


 


 04/25/20 12:28  04/25/20 12:28














Course





- Re-evaluation


Re-evalutation: 





04/25/20 13:30


Patient's chest x-ray on my review does not show evidence of infiltrate.  

Patient's WBC count is 0.1.  Platelet count is 28.  Awaiting official numbers to

calculate ANC and will plan to call hematology for admission for neutropenic 

fever


04/25/20 13:41


spoke with Dr. Bella, Hematology.  Case was discussed, labs were reviewed, 

agrees with symptom management and admission for neutropenic fever please admit 

to hospitalist group.


04/25/20 13:44


spoke with [facility] Hospitalist , case discussed, labs reviewed.  requests I

call Dr. Conroy for admission.


04/25/20 13:49


spoke with Dr. Conroy, requests Vancomycin/Cefipime IV to start now.  will admit





- Vital Signs


Vital signs: 


                                        











Temp Pulse Resp BP Pulse Ox


 


 101.6 F H     18   107/55 L  98 


 


 04/25/20 12:58     04/25/20 13:01  04/25/20 13:01  04/25/20 13:01














- Laboratory


Result Diagrams: 


                                 04/25/20 12:50





                                 04/25/20 12:50


Laboratory results interpreted by me: 


                                        











  04/25/20 04/25/20 04/25/20





  12:50 12:50 12:50


 


WBC  0.1 L*  


 


RBC  2.34 L  


 


Hgb  8.6 L  


 


Hct  23.8 L  


 


MCV  102 H  


 


MCH  36.9 H  


 


MCHC  36.3 H  


 


RDW  16.7 H  


 


Plt Count  28 L*  


 


Lymph % (Auto)  71.6 H  


 


Absolute Neuts (auto)  0.0 L  


 


Absolute Lymphs (auto)  0.1 L  


 


Absolute Monos (auto)  0.0 L  


 


Seg Neutrophils %  17.5 L  


 


PT   15.6 H 


 


Sodium    130.0 L


 


Chloride    96 L


 


BUN    24 H


 


Glucose    312 H


 


Total Bilirubin    1.6 H














Discharge





- Discharge


Clinical Impression: 


 Neutropenic fever, Cough





Condition: Serious


Disposition: ADMITTED AS INPATIENT


Admitting Provider: Marycarmen (Hospitalist)


Unit Admitted: Medical Floor


Referrals: 


MARILYN EL MD [Primary Care Provider] - Follow up as needed

## 2020-04-25 NOTE — RADIOLOGY REPORT (SQ)
EXAM DESCRIPTION:  CHEST SINGLE VIEW



IMAGES COMPLETED DATE/TIME:  4/25/2020 1:37 pm



REASON FOR STUDY:  cough fever



COMPARISON:  7/21/2018.



EXAM PARAMETERS:  NUMBER OF VIEWS: One view.

TECHNIQUE: Single frontal radiographic view of the chest acquired.

RADIATION DOSE: NA

LIMITATIONS: None.



FINDINGS:  LUNGS AND PLEURA: No opacities, masses or pneumothorax. No pleural effusion.

MEDIASTINUM AND HILAR STRUCTURES: No masses.  Contour normal.

HEART AND VASCULAR STRUCTURES: Heart normal in size.  Normal vasculature.

BONES: No acute findings.  Degenerative changes in the spine and shoulders.

HARDWARE: None in the chest.

OTHER: No other significant finding.



IMPRESSION:  NO ACUTE RADIOGRAPHIC FINDING IN THE CHEST.



TECHNICAL DOCUMENTATION:  JOB ID:  [id number]

 2011 [employer]- All Rights Reserved



Reading location - IP/workstation name: [email or web address]

## 2020-04-25 NOTE — RADIOLOGY REPORT (SQ)
EXAM DESCRIPTION:  CT CHEST WITH



IMAGES COMPLETED DATE/TIME:  4/25/2020 4:06 pm



REASON FOR STUDY:  sepsis, unknown source



COMPARISON:  None.



TECHNIQUE:  CT scan of the chest performed using helical scanning technique with dynamic intravenous 
contrast injection.  Images reviewed with lung, soft tissue and bone windows.  Reconstructed coronal 
and sagittal MPR and MIP images reviewed.  All images stored on PACS.

All CT scanners at this facility use dose modulation, iterative reconstruction, and/or weight based d
osing when appropriate to reduce radiation dose to as low as reasonably achievable (ALARA).

[facility]: Dose Right  [facility]: CareDose    [facility]: Dose Right    [facility]: Teradose 4D    [facility]: Smart Technologies



CONTRAST TYPE AND DOSE:  73 mL Omnipaque 350- low osmolar.



RENAL FUNCTION:  BUN 24 creatinine 1.02.



RADIATION DOSE:   .



LIMITATIONS:  None.



FINDINGS:  LUNGS AND PLEURA: 1 cm nodule in the left lower lobe (axial series 4, image 30).  The lung
s are otherwise clear.  No infiltrates.  No pneumothorax. No effusions.

HILAR AND MEDIASTINAL STRUCTURES: No identified masses or abnormal nodes.

HEART AND VASCULAR STRUCTURES: No aneurysm or dissection.  No central pulmonary emboli.  No pericardi
al effusion.

HARDWARE: None in the chest.

UPPER ABDOMEN: No significant findings.  Limited exam.

THYROID AND OTHER SOFT TISSUES: No masses.  No adenopathy.

BONES: No significant finding.

OTHER: No other significant finding.



IMPRESSION:

1. 1 CM NODULE IN THE LEFT LOWER LOBE.  IF THE PATIENT HAS HAD PREVIOUS CHEST CT AT ANOTHER FACILITY,
 THEN RECOMMEND COMPARISON.  IF NO PRIOR STUDIES, THEN MAY CONSIDER PET SCAN.

2. NO OTHER SIGNIFICANT FINDINGS IN THE CHEST.



TECHNICAL DOCUMENTATION:  JOB ID:  [id number]

Quality ID # [id number]: Final reports with documentation of one or more dose reduction techniques (e.g., Au
tomated exposure control, adjustment of the mA and/or kV according to patient size, use of iterative 
reconstruction technique)

 2011 [employer]- All Rights Reserved



Reading location - IP/workstation name: [email or web address]

## 2020-04-25 NOTE — ADVANCED CARE
- Diagnosis


(1) Neutropenic fever


Diagnosis Current: Yes   





(2) Leukemia


Diagnosis Current: Yes   





(3) T2DM (type 2 diabetes mellitus)


Diagnosis Current: Yes   





(4) HTN (hypertension)


Diagnosis Current: Yes   





(5) Dysphagia


Diagnosis Current: Yes   





(6) Pancytopenia


Diagnosis Current: Yes   





(7) Hyponatremia


Diagnosis Current: Yes   





(8) History of head and neck cancer


Diagnosis Current: Yes   


Attendance: 





Patient


Resuscitation Status: Do Not Resuscitate


Discussion: 





All aspects of code discussed including chest 

compressions/cardioversion/intubation and patient states he would like to be DO 

NOT RESUSCITATE/DO NOT INTUBATE.  He states his medical power of  is his

wife.


Time Spent: 17 minutes

## 2020-04-26 LAB
ADD MANUAL DIFF: (no result)
ADD MANUAL DIFF: (no result)
ANISOCYTOSIS BLD QL SMEAR: (no result)
ANISOCYTOSIS BLD QL SMEAR: (no result)
BASOPHILS # BLD AUTO: 0 10^3/UL (ref 0–0.2)
BASOPHILS NFR BLD AUTO: 0 % (ref 0–2)
DACRYOCYTES BLD QL SMEAR: SLIGHT
DACRYOCYTES BLD QL SMEAR: SLIGHT
EOSINOPHIL # BLD AUTO: 0 10^3/UL (ref 0–0.6)
EOSINOPHIL NFR BLD AUTO: 4.6 % (ref 0–6)
EOSINOPHIL NFR BLD AUTO: 8 % (ref 0–6)
ERYTHROCYTE [DISTWIDTH] IN BLOOD BY AUTOMATED COUNT: 16.6 % (ref 11.5–14)
ERYTHROCYTE [DISTWIDTH] IN BLOOD BY AUTOMATED COUNT: 17.3 % (ref 11.5–14)
HCT VFR BLD CALC: 17.5 % (ref 37.9–51)
HCT VFR BLD CALC: 21.2 % (ref 37.9–51)
HGB BLD-MCNC: 6.4 G/DL (ref 13.5–17)
HGB BLD-MCNC: 7.9 G/DL (ref 13.5–17)
LYMPHOCYTES # BLD AUTO: 0.1 10^3/UL (ref 0.5–4.7)
LYMPHOCYTES # BLD AUTO: 0.2 10^3/UL (ref 0.5–4.7)
LYMPHOCYTES NFR BLD AUTO: 76.8 % (ref 13–45)
LYMPHOCYTES NFR BLD AUTO: 82.4 % (ref 13–45)
MACROCYTES BLD QL SMEAR: (no result)
MCH RBC QN AUTO: 35.9 PG (ref 27–33.4)
MCH RBC QN AUTO: 37.2 PG (ref 27–33.4)
MCHC RBC AUTO-ENTMCNC: 36.7 G/DL (ref 32–36)
MCHC RBC AUTO-ENTMCNC: 37.4 G/DL (ref 32–36)
MCV RBC AUTO: 101 FL (ref 80–97)
MCV RBC AUTO: 96 FL (ref 80–97)
MONOCYTES # BLD AUTO: 0 10^3/UL (ref 0.1–1.4)
MONOCYTES NFR BLD AUTO: 2.7 % (ref 3–13)
MONOCYTES NFR BLD AUTO: 7.2 % (ref 3–13)
NEUTROPHILS # BLD AUTO: 0 10^3/UL (ref 1.7–8.2)
NEUTS SEG NFR BLD AUTO: 11.4 % (ref 42–78)
NEUTS SEG NFR BLD AUTO: 6.9 % (ref 42–78)
OVALOCYTES BLD QL SMEAR: (no result)
OVALOCYTES BLD QL SMEAR: SLIGHT
PLATELET # BLD: 49 10^3/UL (ref 150–450)
PLATELET # BLD: 62 10^3/UL (ref 150–450)
PLATELET COMMENT: (no result)
PLATELET COMMENT: (no result)
POIKILOCYTOSIS BLD QL SMEAR: (no result)
POIKILOCYTOSIS BLD QL SMEAR: (no result)
RBC # BLD AUTO: 1.73 10^6/UL (ref 4.35–5.55)
RBC # BLD AUTO: 2.21 10^6/UL (ref 4.35–5.55)
TOTAL CELLS COUNTED % (AUTO): 100 %
WBC # BLD AUTO: 0.1 10^3/UL (ref 4–10.5)
WBC # BLD AUTO: 0.3 10^3/UL (ref 4–10.5)

## 2020-04-26 PROCEDURE — 30233N1 TRANSFUSION OF NONAUTOLOGOUS RED BLOOD CELLS INTO PERIPHERAL VEIN, PERCUTANEOUS APPROACH: ICD-10-PCS | Performed by: INTERNAL MEDICINE

## 2020-04-26 PROCEDURE — 30233R1 TRANSFUSION OF NONAUTOLOGOUS PLATELETS INTO PERIPHERAL VEIN, PERCUTANEOUS APPROACH: ICD-10-PCS | Performed by: INTERNAL MEDICINE

## 2020-04-26 RX ADMIN — OLANZAPINE SCH MG: 5 TABLET, FILM COATED ORAL at 21:39

## 2020-04-26 RX ADMIN — INSULIN LISPRO SCH UNIT: 100 INJECTION, SOLUTION INTRAVENOUS; SUBCUTANEOUS at 21:46

## 2020-04-26 RX ADMIN — VANCOMYCIN HYDROCHLORIDE SCH MLS/HR: 1 INJECTION, POWDER, LYOPHILIZED, FOR SOLUTION INTRAVENOUS at 05:06

## 2020-04-26 RX ADMIN — CEFEPIME SCH MLS/HR: 2 INJECTION, POWDER, FOR SOLUTION INTRAMUSCULAR; INTRAVENOUS at 14:34

## 2020-04-26 RX ADMIN — SERTRALINE HYDROCHLORIDE SCH MG: 50 TABLET ORAL at 21:39

## 2020-04-26 RX ADMIN — ACETAMINOPHEN PRN MG: 325 TABLET ORAL at 17:27

## 2020-04-26 RX ADMIN — INSULIN LISPRO SCH: 100 INJECTION, SOLUTION INTRAVENOUS; SUBCUTANEOUS at 11:00

## 2020-04-26 RX ADMIN — VALACYCLOVIR HYDROCHLORIDE SCH MG: 500 TABLET, FILM COATED ORAL at 11:00

## 2020-04-26 RX ADMIN — INSULIN GLARGINE SCH UNIT: 100 INJECTION, SOLUTION SUBCUTANEOUS at 18:35

## 2020-04-26 RX ADMIN — DOCUSATE SODIUM SCH MG: 50 LIQUID ORAL at 11:00

## 2020-04-26 RX ADMIN — CEFEPIME SCH MLS/HR: 2 INJECTION, POWDER, FOR SOLUTION INTRAMUSCULAR; INTRAVENOUS at 05:06

## 2020-04-26 RX ADMIN — ACETAMINOPHEN PRN MG: 325 TABLET ORAL at 02:37

## 2020-04-26 RX ADMIN — VANCOMYCIN HYDROCHLORIDE SCH MLS/HR: 1 INJECTION, POWDER, LYOPHILIZED, FOR SOLUTION INTRAVENOUS at 18:36

## 2020-04-26 RX ADMIN — INSULIN LISPRO SCH UNIT: 100 INJECTION, SOLUTION INTRAVENOUS; SUBCUTANEOUS at 11:00

## 2020-04-26 RX ADMIN — INSULIN LISPRO SCH: 100 INJECTION, SOLUTION INTRAVENOUS; SUBCUTANEOUS at 17:24

## 2020-04-26 RX ADMIN — CEFEPIME SCH MLS/HR: 2 INJECTION, POWDER, FOR SOLUTION INTRAMUSCULAR; INTRAVENOUS at 21:41

## 2020-04-26 NOTE — PDOC CONSULTATION
Consultation


Consult Date: 04/26/20


Provider Consulted: JEB OLIVEROS


Consult reason:: Hematology/Oncology consultation was requested for patient with

AML and neutropenic fever.





History of Present Illness


Admission Date/PCP: 


  04/25/20 14:46





  MARILYN PERES MD





History of Present Illness: 


Due to COVID-19 restrictions, I have not personally examined this patient.  

However, I have spoked with his wife several times over the last 2 days, as well

as various physicians caring for him in the hospital.  DARÍO HARTMANN is a 75 

year old male who was diagnosed with AML in 12/2019.  He also has a remote 

history of tongue cancer.  He presented initially with pancytopenia and has 

required blood transfusions during his treatment.  Most recently he has been 

receiving Venetoclax (PO) and azacitadine (IV)  Most recent dose was 4/7/2020.  

Two days ago, patient's wife noted that he was having some blood tinged sputum. 

This was less than a Tablespoon, but later that evening, he became weak, had 

difficulty getting to the bathroom, and then developed a fever. He presented to 

the ED with neutropenic fever and pancytopenia.  Over night, he received PLT 

transfusion, but the bleeding from the respiratory tract continues.  He now 

remains febrile and blood clutures are growing pseudomonas.  


According to Hospitalist, plan is to transfer him to tertiary center for 

possible bronchoscopy and transfuse 2 unit pRBCs and PLTs once available.  








Past Medical History


Cardiac Medical History: Reports: Hypertension


Pulmonary Medical History: Reports: Pneumonia


Endocrine Medical History: Reports: Diabetes Mellitus Type 2


Malignancy Medical History: Reports: Leukemia, Other - Head and neck cancer


Musculoskeltal Medical History: Reports: Arthritis


Psychiatric Medical History: Reports: Bipolar Disorder, Depression, Post 

Traumatic Stress Disorder


Hematology: Reports: Anemia





Past Surgical History


Past Surgical History: Reports: Cholecystectomy, Other - Glossal resection  PEG 

tube insertion





Social History


Lives with: Family, Spouse/Significant other


Smoking Status: Former Smoker


Electronic Cigarette use?: No


Frequency of Alcohol Use: Rare


Hx Recreational Drug Use: No


Drugs: None


Hx Prescription Drug Abuse: No





- Advance Directive


Resuscitation Status: Do Not Resuscitate





Family History


Family History: DM, Hypertension


Parental Family History Reviewed: No


Children Family History Reviewed: No


Sibling(s) Family History Reviewed.: No





Medication/Allergy


Home Medications: 








Olanzapine [Zyprexa] 20 mg PO QHS 07/21/18 


Sertraline HCl 100 mg PO QHS 07/21/18 


Cefdinir 300 mg PO BID 04/25/20 


Insulin Glargine,Hum.rec.anlog [Lantus Insulin 100 Unit/mL Insulin Pen] 8 units 

IJ QPM 04/25/20 


Insulin Lispro [Humalog Kwikpen] 16 unit SQ ASDIR PRN 04/25/20 


Valacyclovir HCl [Valacyclovir] 500 mg PO DAILY 04/25/20 








Allergies/Adverse Reactions: 


                                        





morphine Allergy (Verified 04/25/20 12:57)


   











Review of Systems


ROS unobtainable: Other - Due to COVID-19 restrictions, I was not able to speak 

directly with the patient.





Physical Exam


Vital Signs: 


                                        











Temp Pulse Resp BP Pulse Ox


 


 98.8 F   87   21 H  144/64 H  98 


 


 04/26/20 07:52  04/26/20 07:52  04/26/20 07:52  04/26/20 07:52  04/26/20 07:52








                                 Intake & Output











 04/25/20 04/26/20 04/27/20





 06:59 06:59 06:59


 


Intake Total  1744 0


 


Balance  1744 0


 


Weight  63.5 kg 











Exam: 





Due to COVID-19 restrictions, I was not able to exam the patient.  





Results


Laboratory Results: 


                                        





                                 04/26/20 04:08 





                                 04/25/20 17:30 





                                        











  04/25/20 04/25/20 04/25/20





  12:50 12:50 12:50


 


WBC  0.1 L*  


 


RBC  2.34 L  


 


Hgb  8.6 L  


 


Hct  23.8 L  


 


MCV  102 H  


 


MCH  36.9 H  


 


MCHC  36.3 H  


 


RDW  16.7 H  


 


Plt Count  28 L*  


 


Seg Neutrophils %  17.5 L  


 


VBG pH    7.41


 


VBG pCO2    36.8


 


VBG HCO3    22.6


 


VBG Base Excess    -1.7


 


Sodium   130.0 L 


 


Potassium   3.9 


 


Chloride   96 L 


 


Carbon Dioxide   22 


 


Anion Gap   12 


 


BUN   24 H 


 


Creatinine   1.02 


 


Est GFR ( Amer)   > 60 


 


Glucose   312 H 


 


Lactic Acid   


 


Calcium   8.9 


 


Magnesium   


 


Total Bilirubin   1.6 H 


 


AST   18 


 


Alkaline Phosphatase   87 


 


Total Protein   7.7 


 


Albumin   4.0 


 


Lipase   


 


Urine Color   


 


Urine Appearance   


 


Urine pH   


 


Ur Specific Gravity   


 


Urine Protein   


 


Urine Glucose (UA)   


 


Urine Ketones   


 


Urine Blood   


 


Urine RBC (Auto)   


 


Blood Type   


 


Antibody Screen   














  04/25/20 04/25/20 04/25/20





  12:50 13:21 17:30


 


WBC   


 


RBC   


 


Hgb   


 


Hct   


 


MCV   


 


MCH   


 


MCHC   


 


RDW   


 


Plt Count   


 


Seg Neutrophils %   


 


VBG pH   


 


VBG pCO2   


 


VBG HCO3   


 


VBG Base Excess   


 


Sodium   


 


Potassium   


 


Chloride   


 


Carbon Dioxide   


 


Anion Gap   


 


BUN   


 


Creatinine   


 


Est GFR (African Amer)   


 


Glucose   


 


Lactic Acid  1.9   4.1 H


 


Calcium   


 


Magnesium   


 


Total Bilirubin   


 


AST   


 


Alkaline Phosphatase   


 


Total Protein   


 


Albumin   


 


Lipase   


 


Urine Color   YELLOW 


 


Urine Appearance   CLEAR 


 


Urine pH   6.0 


 


Ur Specific [place]   1.011 


 


Urine Protein   NEGATIVE 


 


Urine Glucose (UA)   50 H 


 


Urine Ketones   NEGATIVE 


 


Urine Blood   NEGATIVE 


 


Urine RBC (Auto)   0 


 


Blood Type   


 


Antibody Screen   














  04/25/20 04/25/20 04/25/20





  17:30 20:30 21:36


 


WBC   


 


RBC   


 


Hgb   


 


Hct   


 


MCV   


 


MCH   


 


MCHC   


 


RDW   


 


Plt Count   


 


Seg Neutrophils %   


 


VBG pH   


 


VBG pCO2   


 


VBG HCO3   


 


VBG Base Excess   


 


Sodium  131.7 L  


 


Potassium  3.8  


 


Chloride  99  


 


Carbon Dioxide  17 L  


 


Anion Gap  16  


 


BUN  23 H  


 


Creatinine  0.88  


 


Est GFR ( Amer)  > 60  


 


Glucose  284 H  


 


Lactic Acid   3.2 H 


 


Calcium  8.7  


 


Magnesium   


 


Total Bilirubin   


 


AST   


 


Alkaline Phosphatase   


 


Total Protein   


 


Albumin   


 


Lipase   


 


Urine Color   


 


Urine Appearance   


 


Urine pH   


 


Ur Specific Gravity   


 


Urine Protein   


 


Urine Glucose (UA)   


 


Urine Ketones   


 


Urine Blood   


 


Urine RBC (Auto)   


 


Blood Type    A POSITIVE


 


Antibody Screen    NEGATIVE














  04/26/20 04/26/20





  04:08 04:08


 


WBC  0.1 L* 


 


RBC  1.73 L 


 


Hgb  6.4 L D 


 


Hct  17.5 L 


 


MCV  101 H 


 


MCH  37.2 H 


 


MCHC  36.7 H 


 


RDW  16.6 H 


 


Plt Count  49 L 


 


Seg Neutrophils %  11.4 L 


 


VBG pH  


 


VBG pCO2  


 


VBG HCO3  


 


VBG Base Excess  


 


Sodium  


 


Potassium  


 


Chloride  


 


Carbon Dioxide  


 


Anion Gap  


 


BUN  


 


Creatinine  


 


Est GFR (African Amer)  


 


Glucose  


 


Lactic Acid  


 


Calcium  


 


Magnesium   1.7


 


Total Bilirubin  


 


AST  


 


Alkaline Phosphatase  


 


Total Protein  


 


Albumin  


 


Lipase   60.7


 


Urine Color  


 


Urine Appearance  


 


Urine pH  


 


Ur Specific Gravity  


 


Urine Protein  


 


Urine Glucose (UA)  


 


Urine Ketones  


 


Urine Blood  


 


Urine RBC (Auto)  


 


Blood Type  


 


Antibody Screen  








                                        





04/25/20 12:50   Blood   Blood Culture (PCR) - Final


                            Pseudomonas Aeruginosa








Impressions: 


                                        





Abdomen/Pelvis CT  04/25/20 00:00


IMPRESSION:  NO SIGNIFICANT OR ACUTE FINDING IN THE ABDOMEN OR PELVIS ON CT SCAN

 WITH IV CONTRAST.


 








Chest CT  04/25/20 00:00


IMPRESSION:


1. 1 CM NODULE IN THE LEFT LOWER LOBE.  IF THE PATIENT HAS HAD PREVIOUS CHEST CT

 AT ANOTHER FACILITY, THEN RECOMMEND COMPARISON.  IF NO PRIOR STUDIES, THEN MAY 

CONSIDER PET SCAN.


2. NO OTHER SIGNIFICANT FINDINGS IN THE CHEST.


 








Chest X-Ray  04/25/20 12:48


IMPRESSION:  NO ACUTE RADIOGRAPHIC FINDING IN THE CHEST.


 














Assessment & Plan





- Plan Summary


Plan Summary: 





1.  AML with pancytpopenia due to disease and treatment.  All chemo will be 

held.  


2.  Pancytopenia - transfuse pRBCs and PLTs.  Although leuko-poor, irradiated is

 recommended, due to difficulty getting this promptly, I agree with hospitalist 

that there is more benefit vs. risk in transfusing regular blood instead of 

waiting on leuko-poor, irradiated.  


3.  Bacteremia with neutropenic fever due to pseudomonas.  Source is yet 

unknown, but patient is on appropriate antibiotics and is being covered with 

acyclovir prophylaxis as well.  He has been receiving G- mg sc daily - 

most recent was 4/24/20.  I will add this today.  


4.  I will reach out to discuss with patient's wife today.  Code status has been

 addressed with the patient.  I am available for any questions and Dr. Peres 

will return tomorrow.

## 2020-04-26 NOTE — PDOC PROGRESS REPORT
Subjective


Progress Note for:: 04/26/20


Subjective:: 





Patient doing much worse today.  Discussed with Dr. Echeverria that patient had 

hemoptysis overnight of approximately 30 to 40 mL his hemoglobin also acutely 

dropped to 6.4 but his platelets did rise after being transfused 1 unit of 

these.  Overnight physician ordered 2 units irradiated PRBC however these are 

not available yet reportedly coming from [place] and we have no idea when this 

will get here.  Patient continues to have bleeding from his mouth and hemoptysis

.  He has a new area of crackles in his right mid and lower lung field.  I 

discussed the case with ICU who had been consulted by Dr. Echeverria last night and

initially refused transfer.  Given the continued worsening of the patient 

clinically and increasing oxygen requirements with high likelihood of persistent

deterioration, patient has now been accepted to ICU.  Patient will be 

transferred to ICU to ICU to [facility] assuming they accept the transfer.  I 

had a lengthy discussion with the patient and and also his wife/son separately. 

I would like patient to be made full code with the intention of him being 

intubated so that he can be transferred as safely as possible to a tertiary 

facility.  They were very clear that they do not want patient to be in a 

persistent vegetative state or to persist on a ventilator long-term.  Family and

the patient agreed that he would like to be made comfortable if he continues to 

decline to the point where there is nothing else that can be done.  At that time

they would like to be notified and then make him DNR/DNI/comfort care.  I spoke 

with Dr. Watts in oncology who was consulted last night.  She stated that if 

patient is unable to get irradiated blood products that we can emergently give 

him non-irradiated blood products in order to save him from exsanguination.  

Given that we have no idea when his irradiated blood will get here, I have 

ordered not irradiated blood products as well and these can be canceled/not 

given if the irradiated blood gets here first.  Patient has a very poor 

prognosis and there is a high chance he will not survive this hospitalization.  

I have communicated this to his family.  They are agreeable to transfer to [facility] [place] though they prefer [facility], I explained that 2-1/2 hours in ambulance 

is much too far and it would put the patient at unnecessary risk.  Dr. Watts also

mentioned that patient has been getting G-CSF outpatient and she will continue 

this here to hopefully stimulate his bone marrow to begin producing blood cells 

again.  This effect is likely to be days from now unfortunately.


Reason For Visit: 


PANCYTOPENIA,HEMOPTYSIS AND NEUTROPENIC FEVER








Physical Exam


Vital Signs: 


                                        











Temp Pulse Resp BP Pulse Ox


 


 98.8 F   87   21 H  144/64 H  98 


 


 04/26/20 07:52  04/26/20 07:52  04/26/20 07:52  04/26/20 07:52  04/26/20 07:52








                                 Intake & Output











 04/25/20 04/26/20 04/27/20





 06:59 06:59 06:59


 


Intake Total  1744 


 


Balance  1744 


 


Weight  63.5 kg 











General appearance: PRESENT: no acute distress, well-developed, well-nourished


Head exam: PRESENT: atraumatic, normocephalic


Eye exam: PRESENT: conjunctiva pale


Mouth exam: PRESENT: dry mucosa, other - Blood all over teeth and tongue


Respiratory exam: PRESENT: crackles - Right middle and right lower lobes, 

rhonchi, unlabored.  ABSENT: accessory muscle use, clear to auscultation mary, 

tachypnea, wheezes


Cardiovascular exam: PRESENT: RRR.  ABSENT: diastolic murmur, rubs, systolic 

murmur


GI/Abdominal exam: PRESENT: distended, normal bowel sounds, soft.  ABSENT: 

guarding, mass, organolmegaly, rebound, tenderness


Rectal exam: PRESENT: deferred


Neurological exam: PRESENT: alert, awake, oriented to person, oriented to place,

oriented to time, oriented to situation


Psychiatric exam: PRESENT: appropriate affect, normal mood


Skin exam: PRESENT: dry, intact, warm





Results


Laboratory Results: 


                                        





                                 04/26/20 04:08 





                                 04/25/20 17:30 





                                        











  04/25/20 04/25/20 04/25/20





  12:50 12:50 12:50


 


WBC  0.1 L*  


 


RBC  2.34 L  


 


Hgb  8.6 L  


 


Hct  23.8 L  


 


MCV  102 H  


 


MCH  36.9 H  


 


MCHC  36.3 H  


 


RDW  16.7 H  


 


Plt Count  28 L*  


 


Seg Neutrophils %  17.5 L  


 


VBG pH    7.41


 


VBG pCO2    36.8


 


VBG HCO3    22.6


 


VBG Base Excess    -1.7


 


Sodium   130.0 L 


 


Potassium   3.9 


 


Chloride   96 L 


 


Carbon Dioxide   22 


 


Anion Gap   12 


 


BUN   24 H 


 


Creatinine   1.02 


 


Est GFR ( Amer)   > 60 


 


Glucose   312 H 


 


Lactic Acid   


 


Calcium   8.9 


 


Magnesium   


 


Total Bilirubin   1.6 H 


 


AST   18 


 


Alkaline Phosphatase   87 


 


Total Protein   7.7 


 


Albumin   4.0 


 


Lipase   


 


Urine Color   


 


Urine Appearance   


 


Urine pH   


 


Ur Specific Gravity   


 


Urine Protein   


 


Urine Glucose (UA)   


 


Urine Ketones   


 


Urine Blood   


 


Urine RBC (Auto)   


 


Blood Type   


 


Antibody Screen   














  04/25/20 04/25/20 04/25/20





  12:50 13:21 17:30


 


WBC   


 


RBC   


 


Hgb   


 


Hct   


 


MCV   


 


MCH   


 


MCHC   


 


RDW   


 


Plt Count   


 


Seg Neutrophils %   


 


VBG pH   


 


VBG pCO2   


 


VBG HCO3   


 


VBG Base Excess   


 


Sodium   


 


Potassium   


 


Chloride   


 


Carbon Dioxide   


 


Anion Gap   


 


BUN   


 


Creatinine   


 


Est GFR (African Amer)   


 


Glucose   


 


Lactic Acid  1.9   4.1 H


 


Calcium   


 


Magnesium   


 


Total Bilirubin   


 


AST   


 


Alkaline Phosphatase   


 


Total Protein   


 


Albumin   


 


Lipase   


 


Urine Color   YELLOW 


 


Urine Appearance   CLEAR 


 


Urine pH   6.0 


 


Ur Specific Todd   1.011 


 


Urine Protein   NEGATIVE 


 


Urine Glucose (UA)   50 H 


 


Urine Ketones   NEGATIVE 


 


Urine Blood   NEGATIVE 


 


Urine RBC (Auto)   0 


 


Blood Type   


 


Antibody Screen   














  04/25/20 04/25/20 04/25/20





  17:30 20:30 21:36


 


WBC   


 


RBC   


 


Hgb   


 


Hct   


 


MCV   


 


MCH   


 


MCHC   


 


RDW   


 


Plt Count   


 


Seg Neutrophils %   


 


VBG pH   


 


VBG pCO2   


 


VBG HCO3   


 


VBG Base Excess   


 


Sodium  131.7 L  


 


Potassium  3.8  


 


Chloride  99  


 


Carbon Dioxide  17 L  


 


Anion Gap  16  


 


BUN  23 H  


 


Creatinine  0.88  


 


Est GFR ( Amer)  > 60  


 


Glucose  284 H  


 


Lactic Acid   3.2 H 


 


Calcium  8.7  


 


Magnesium   


 


Total Bilirubin   


 


AST   


 


Alkaline Phosphatase   


 


Total Protein   


 


Albumin   


 


Lipase   


 


Urine Color   


 


Urine Appearance   


 


Urine pH   


 


Ur Specific Gravity   


 


Urine Protein   


 


Urine Glucose (UA)   


 


Urine Ketones   


 


Urine Blood   


 


Urine RBC (Auto)   


 


Blood Type    A POSITIVE


 


Antibody Screen    NEGATIVE














  04/26/20 04/26/20





  04:08 04:08


 


WBC  0.1 L* 


 


RBC  1.73 L 


 


Hgb  6.4 L D 


 


Hct  17.5 L 


 


MCV  101 H 


 


MCH  37.2 H 


 


MCHC  36.7 H 


 


RDW  16.6 H 


 


Plt Count  49 L 


 


Seg Neutrophils %  11.4 L 


 


VBG pH  


 


VBG pCO2  


 


VBG HCO3  


 


VBG Base Excess  


 


Sodium  


 


Potassium  


 


Chloride  


 


Carbon Dioxide  


 


Anion Gap  


 


BUN  


 


Creatinine  


 


Est GFR (African Amer)  


 


Glucose  


 


Lactic Acid  


 


Calcium  


 


Magnesium   1.7


 


Total Bilirubin  


 


AST  


 


Alkaline Phosphatase  


 


Total Protein  


 


Albumin  


 


Lipase   60.7


 


Urine Color  


 


Urine Appearance  


 


Urine pH  


 


Ur Specific Gravity  


 


Urine Protein  


 


Urine Glucose (UA)  


 


Urine Ketones  


 


Urine Blood  


 


Urine RBC (Auto)  


 


Blood Type  


 


Antibody Screen  








                                        





04/25/20 12:50   Blood   Blood Culture (PCR) - Final


                            Pseudomonas Aeruginosa








Impressions: 


                                        





Abdomen/Pelvis CT  04/25/20 00:00


IMPRESSION:  NO SIGNIFICANT OR ACUTE FINDING IN THE ABDOMEN OR PELVIS ON CT SCAN

WITH IV CONTRAST.


 








Chest CT  04/25/20 00:00


IMPRESSION:


1. 1 CM NODULE IN THE LEFT LOWER LOBE.  IF THE PATIENT HAS HAD PREVIOUS CHEST CT

AT ANOTHER FACILITY, THEN RECOMMEND COMPARISON.  IF NO PRIOR STUDIES, THEN MAY 

CONSIDER PET SCAN.


2. NO OTHER SIGNIFICANT FINDINGS IN THE CHEST.


 








Chest X-Ray  04/25/20 12:48


IMPRESSION:  NO ACUTE RADIOGRAPHIC FINDING IN THE CHEST.


 














Assessment and Plan





- Diagnosis


(1) Neutropenic fever


Is this a current diagnosis for this admission?: Yes   


Plan: 





Fever up to 102, tachycardic, unclear source on admission


Vancomycin/cefepime started empirically on admission


Blood cultures


Sputum culture


UA with reflex culture





4/26/2020


Growing Pseudomonas in his blood culture already which is very concerning; 

suspect possible pulmonary source though this was not seen on CT scan


Already on vancomycin/cefepime which is continued








(2) Leukemia


Qualifiers: 


   Leukemia type: myeloid   Myeloid leukemia type: acute   Leukemia 

Active/Remission status: relapsed   Qualified Code(s): C92.02 - Acute 

myeloblastic leukemia, in relapse; C92.62 - Acute myeloid leukemia with 

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 

dysplasia, in relapse   


Is this a current diagnosis for this admission?: Yes   


Plan: 





Unknown type of leukemia as nothing is documented in the chart


Currently undergoing chemotherapy per patient for the last 5 to 6 months, last 

chemo was 1 week PTA


Oncology consulted on admission, defer to them for any needs for bone marrow 

stimulant such as Granix





4/26/2020


Was receiving G-CSF prior to admission, per Dr. Watts she will continue giving 

this here though we discussed that this could potentially worsen the cell 

production of his leukemia; benefits outweigh the risks in this case


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 

likely contracted this as a result of his extensive radiation treatment for his 

head and neck cancer previously








(3) Hemoptysis


Is this a current diagnosis for this admission?: Yes   


Plan: 





Started overnight/early a.m. 4/26/2020


Pulmonology consulted who stated they want patient transferred to a tertiary 

facility with thoracic surgery/pulmonology capabilities as the patient will 

potentially need to be bronched


Trend CBC


Transfuse hemoglobin, multiple units ordered


Transfuse platelets, multiple units ordered








(4) T2DM (type 2 diabetes mellitus)


Qualifiers: 


   Diabetes mellitus long term insulin use: with long term use   Diabetes 

mellitus complication status: without complication   Qualified Code(s): E11.9 - 

Type 2 diabetes mellitus without complications; Z79.4 - Long term (current) use 

of insulin   


Is this a current diagnosis for this admission?: Yes   





(5) HTN (hypertension)


Is this a current diagnosis for this admission?: Yes   





(6) Dysphagia


Is this a current diagnosis for this admission?: Yes   





(7) Pancytopenia


Is this a current diagnosis for this admission?: Yes   





(8) Hyponatremia


Is this a current diagnosis for this admission?: Yes   





(9) History of head and neck cancer


Is this a current diagnosis for this admission?: Yes   





(10) Pseudomonal bacteremia


Is this a current diagnosis for this admission?: Yes   


Plan: 





Seen on blood cultures 1/2 initially


Covered with cefepime








(11) Acute respiratory failure with hypoxemia


Is this a current diagnosis for this admission?: Yes   


Plan: 





Supplemental oxygen to maintain saturation greater than 94%








- Time


Time Spent with patient: 35 or more minutes


Medications reviewed and adjusted accordingly: Yes


Anticipated discharge: [facility]


Disposition: 





Total critical care time spent: 40 minutes


11 AM to 11:40 AM





- Inpatient Certification


Based on my medical assessment, after consideration of the patient's 

comorbidities, presenting symptoms, or acuity I expect that the services needed 

warrant INPATIENT care.: Yes


I certify that my determination is in accordance with my understanding of 

Medicare's requirements for reasonable and necessary INPATIENT services [42 CFR 

412.3e].: Yes


Medical Necessity: Significant Comorbidiites Make Outpatient Treatment Too 

Risky, Need Close Monitoring Due to Risk of Patient Decompensation, Need For IV 

Fluids, Need for IV Antibiotics, Risk of Complication if Not Cared For in 

Hospital, Risk of Diagnosis Which Will Require Inpatient Eval/Care/Monitoring

## 2020-04-26 NOTE — CRITICAL CARE ADMISSION REPORT
[facility] [place]


Date:: 04/26/20


Time:: 16:25


Reason for ICU Reason:: Severe Hemoptysis


HPI: 


75-year-old gentleman with a history of head and neck cancer treated with 

radiation in the past, DM 2, history of CVA, HTN.  Patient was recently 

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

 He now presents with a 3 to 4-day history of progressive cough and fever 

greater than 102 degrees.  Patient was initially admitted to the medicine 

service.  Overnight, he developed hemoptysis with significant blood loss. 

Hemoglobin decreased from 8.6 to 6.4.  Patient does have pancytopenia with 

platelets of 28 (now 49 status post transfusion).  Rule out COVID-19 labs have 

been sent due to his shortness of breath on presentation accompanied with fever.

 The intensive care unit was initially consulted for a rigid bronchoscopy and 

possible cauterization which we unfortunately do not have the ability to do at 

this facility.  Steps have been made to initiate transfer to tertiary facility. 

Patient seen by pulmonary who recommended intubation for airway protection prior

to transfer.  His DNR status was temporarily rescinded to accommodate this 

process, however, patient's wishes are not to be kept on prolonged mechanical 

ventilation.


A moderate sized abscess was discovered today on the lateral aspect of patient's

left arm.  CT scan results pending.


Upon arrival to the intensive care unit, patient was in no acute distress on 4 L

nasal cannula.  Last episode of hemoptysis was 9 AM this morning.


History obtained from:: Medical record





- Diagnosis/Plan


(1) Hemoptysis


Is this a current diagnosis for this admission?: Yes   


Plan: 


Given the patient's hemoptysis in the setting of thrombocytopenia, the current 

plan is to transfer him to a tertiary care facility with possible interventional

bronchoscopy.  Plan will most likely be to intubate Mr. Dailey to protect his 

airway during transfer.








(2) Neutropenic fever


Is this a current diagnosis for this admission?: Yes   


Plan: 


Patient started on vancomycin and cefepime for treatment of his neutropenic 

fever.  No clear source of infection at this point.








(3) Diabetes mellitus


Qualifiers: 


   Diabetes mellitus type: type 2   Diabetes mellitus long term insulin use: 

with long term use 


Is this a current diagnosis for this admission?: Yes   


Plan: 


Regular insulin sliding scale.








Past Medical History


Cardiac Medical History: Reports: Hypertension


Pulmonary Medical History: Reports: Pneumonia


Endocrine Medical History: Reports: Diabetes Mellitus Type 2


Malignancy Medical History: Reports: Leukemia, Other - AML, head and neck cancer


Musculoskeltal Medical History: Reports: Arthritis


Psychiatric Medical History: Reports: Bipolar Disorder, Depression, Post 

Traumatic Stress Disorder


Hematology: Reports: Anemia, Neutropenia





Past Surgical History


Past Surgical History: Reports: Cholecystectomy, Other - Glossal resection  PEG 

tube insertion





Social/Family History





- Social History


Lives with: Family, Spouse/Significant other


Smoking Status: Former Smoker


Frequency of Alcohol Use: Rare


Hx Recreational Drug Use: No


Drugs: None


Hx Prescription Drug Abuse: No





- Medication/Allergies


Home Medications: 








Olanzapine [Zyprexa] 20 mg PO QHS 07/21/18 


Sertraline HCl 100 mg PO QHS 07/21/18 


Cefdinir 300 mg PO BID 04/25/20 


Insulin Glargine,Hum.rec.anlog [Lantus Insulin 100 Unit/mL Insulin Pen] 8 units 

IJ QPM 04/25/20 


Insulin Lispro [Humalog Kwikpen] 16 unit SQ ASDIR PRN 04/25/20 


Valacyclovir HCl [Valacyclovir] 500 mg PO DAILY 04/25/20 








Allergies/Adverse Reactions: 


                                        





morphine Allergy (Verified 04/25/20 12:57)


   











Review of Systems


Review of Systems: 





Patient endorses a cough this morning but otherwise currently has no specific 

complaints other than generalized weakness.


Constitutional: PRESENT: as per HPI





Physical Exam


Vital Signs: 


                                        











Temp Pulse Resp BP Pulse Ox


 


 98.8 F   84   19   144/65 H  98 


 


 04/26/20 14:45  04/26/20 14:45  04/26/20 14:45  04/26/20 14:45  04/26/20 14:45








                                 Intake & Output











 04/25/20 04/26/20 04/27/20





 06:59 06:59 06:59


 


Intake Total  1744 295


 


Output Total   875


 


Balance  1744 -580


 


Weight  63.5 kg 








                                  Weight/Height





Weight                           63.5 kg


Height                           5 ft 8 in








General appearance: PRESENT: no acute distress, cooperative


Head exam: PRESENT: atraumatic, normocephalic


Eye exam: PRESENT: EOMI, PERRLA


Neck exam: ABSENT: carotid bruit, JVD, tenderness


Respiratory exam: PRESENT: clear to auscultation mary, unlabored.  ABSENT: 

accessory muscle use, chest wall tenderness, crackles, prolonged expiratory 

phas, rales, rhonchi, wheezes


Cardiovascular exam: PRESENT: RRR, +S1, +S2


Pulses: PRESENT: +2 pedal pulses bilateral


Vascular exam: PRESENT: normal capillary refill


GI/Abdominal exam: PRESENT: normal bowel sounds, soft.  ABSENT: tenderness


Musculoskeletal exam: PRESENT: normal inspection


Neurological exam: PRESENT: alert, awake, oriented to person, oriented to place,

 oriented to time, oriented to situation, CN II-XII grossly intact


Psychiatric exam: PRESENT: appropriate affect, normal mood


Skin exam: PRESENT: other - Left lateral arm abscess, fluctuant, mildly 

draining.





Laboratory/Radiographs


Laboratory Results: 


                                        





                                 04/26/20 04:08 





                                 04/25/20 17:30 





                                        











  04/25/20 04/25/20 04/25/20





  17:30 17:30 20:30


 


WBC   


 


RBC   


 


Hgb   


 


Hct   


 


MCV   


 


MCH   


 


MCHC   


 


RDW   


 


Plt Count   


 


Seg Neutrophils %   


 


Sodium   131.7 L 


 


Potassium   3.8 


 


Chloride   99 


 


Carbon Dioxide   17 L 


 


Anion Gap   16 


 


BUN   23 H 


 


Creatinine   0.88 


 


Est GFR ( Amer)   > 60 


 


Glucose   284 H 


 


Lactic Acid  4.1 H   3.2 H


 


Calcium   8.7 


 


Magnesium   


 


Lipase   


 


Blood Type   


 


Antibody Screen   














  04/25/20 04/26/20 04/26/20





  21:36 04:08 04:08


 


WBC   0.1 L* 


 


RBC   1.73 L 


 


Hgb   6.4 L D 


 


Hct   17.5 L 


 


MCV   101 H 


 


MCH   37.2 H 


 


MCHC   36.7 H 


 


RDW   16.6 H 


 


Plt Count   49 L 


 


Seg Neutrophils %   11.4 L 


 


Sodium   


 


Potassium   


 


Chloride   


 


Carbon Dioxide   


 


Anion Gap   


 


BUN   


 


Creatinine   


 


Est GFR (African Amer)   


 


Glucose   


 


Lactic Acid   


 


Calcium   


 


Magnesium    1.7


 


Lipase    60.7


 


Blood Type  A POSITIVE  


 


Antibody Screen  NEGATIVE  








                                        





04/25/20 12:50   Blood   Blood Culture (PCR) - Final


                            Pseudomonas Aeruginosa








Impressions: 


                                        





Abdomen/Pelvis CT  04/25/20 00:00


IMPRESSION:  NO SIGNIFICANT OR ACUTE FINDING IN THE ABDOMEN OR PELVIS ON CT SCAN

 WITH IV CONTRAST.


 








Chest CT  04/25/20 00:00


IMPRESSION:


1. 1 CM NODULE IN THE LEFT LOWER LOBE.  IF THE PATIENT HAS HAD PREVIOUS CHEST CT

 AT ANOTHER FACILITY, THEN RECOMMEND COMPARISON.  IF NO PRIOR STUDIES, THEN MAY 

CONSIDER PET SCAN.


2. NO OTHER SIGNIFICANT FINDINGS IN THE CHEST.


 








Chest X-Ray  04/25/20 12:48


IMPRESSION:  NO ACUTE RADIOGRAPHIC FINDING IN THE CHEST.


 











All labs, radiographs, diagnostic studies and EKGs were personally reviewed: Yes


In addition, reports of radiographic and diagnostic studies were read: Yes





Critical Time


Critical Time (minutes): 65


-: 


The care of a critically ill patient is dynamic.  This note represents a static 

moment in the admission process. Orders and treatments may be given 

simultaneously and urgently, and time is not representative of the treatment 

process.





This patient requires Critical Care secondary to life threatening organ or limb 

dysfunction.  Without Critical Care services, the patient is at risk for 

increased mortality and morbidity.

## 2020-04-26 NOTE — RADIOLOGY REPORT (SQ)
EXAM DESCRIPTION:  CT LT UPPER EXTREMITY WITHOUT



IMAGES COMPLETED DATE/TIME:  4/26/2020 3:54 pm



REASON FOR STUDY:  cellulitis and abscess



COMPARISON:  None.



TECHNIQUE:  Axial imaging performed through the

Left elbow

with reformatted coronal and sagittal imaging windowed for bone and soft tissues. Images saved to PAC
S.

3D IMAGING: Were 3D images as MIP, SSD, or volume rendering performed at the work station?

No



LIMITATIONS:  None.



FINDINGS:  SOFT TISSUES: Cellulitis without evidence of abscess.

BONY STRUCTURES: No evidence of osteomyelitis.

MINERALIZATION: Normal.

OTHER: No other significant finding.



IMPRESSION:  No evidence of abscess or osteomyelitis.



Reading location - IP/workstation name: [email or web address]

## 2020-04-27 LAB
ADD MANUAL DIFF: (no result)
ALBUMIN SERPL-MCNC: 3 G/DL (ref 3.5–5)
ALP SERPL-CCNC: 63 U/L (ref 38–126)
ANION GAP SERPL CALC-SCNC: 8 MMOL/L (ref 5–19)
ANION GAP SERPL CALC-SCNC: 9 MMOL/L (ref 5–19)
ANISOCYTOSIS BLD QL SMEAR: (no result)
AST SERPL-CCNC: 22 U/L (ref 17–59)
BASOPHILS # BLD AUTO: 0 10^3/UL (ref 0–0.2)
BASOPHILS NFR BLD AUTO: 1.5 % (ref 0–2)
BILIRUB DIRECT SERPL-MCNC: 0.2 MG/DL (ref 0–0.4)
BILIRUB SERPL-MCNC: 2 MG/DL (ref 0.2–1.3)
BUN SERPL-MCNC: 27 MG/DL (ref 7–20)
BUN SERPL-MCNC: 29 MG/DL (ref 7–20)
CALCIUM: 8.4 MG/DL (ref 8.4–10.2)
CALCIUM: 8.6 MG/DL (ref 8.4–10.2)
CHLORIDE SERPL-SCNC: 101 MMOL/L (ref 98–107)
CHLORIDE SERPL-SCNC: 98 MMOL/L (ref 98–107)
CO2 SERPL-SCNC: 22 MMOL/L (ref 22–30)
CO2 SERPL-SCNC: 22 MMOL/L (ref 22–30)
EOSINOPHIL # BLD AUTO: 0 10^3/UL (ref 0–0.6)
EOSINOPHIL NFR BLD AUTO: 15.9 % (ref 0–6)
ERYTHROCYTE [DISTWIDTH] IN BLOOD BY AUTOMATED COUNT: 17.2 % (ref 11.5–14)
ERYTHROCYTE [DISTWIDTH] IN BLOOD BY AUTOMATED COUNT: 17.5 % (ref 11.5–14)
GLUCOSE SERPL-MCNC: 258 MG/DL (ref 75–110)
GLUCOSE SERPL-MCNC: 334 MG/DL (ref 75–110)
HCT VFR BLD CALC: 21.4 % (ref 37.9–51)
HCT VFR BLD CALC: 23.7 % (ref 37.9–51)
HGB BLD-MCNC: 8 G/DL (ref 13.5–17)
HGB BLD-MCNC: 8.6 G/DL (ref 13.5–17)
LYMPHOCYTES # BLD AUTO: 0.1 10^3/UL (ref 0.5–4.7)
LYMPHOCYTES NFR BLD AUTO: 71.5 % (ref 13–45)
MCH RBC QN AUTO: 35 PG (ref 27–33.4)
MCH RBC QN AUTO: 35.2 PG (ref 27–33.4)
MCHC RBC AUTO-ENTMCNC: 36.3 G/DL (ref 32–36)
MCHC RBC AUTO-ENTMCNC: 37.2 G/DL (ref 32–36)
MCV RBC AUTO: 95 FL (ref 80–97)
MCV RBC AUTO: 96 FL (ref 80–97)
MONOCYTES # BLD AUTO: 0 10^3/UL (ref 0.1–1.4)
MONOCYTES NFR BLD AUTO: 4.3 % (ref 3–13)
NEUTROPHILS # BLD AUTO: 0 10^3/UL (ref 1.7–8.2)
NEUTS SEG NFR BLD AUTO: 6.8 % (ref 42–78)
OVALOCYTES BLD QL SMEAR: (no result)
PATH REV BLD -IMP: (no result)
PLATELET # BLD: 57 10^3/UL (ref 150–450)
PLATELET # BLD: 60 10^3/UL (ref 150–450)
PLATELET COMMENT: (no result)
POIKILOCYTOSIS BLD QL SMEAR: (no result)
POTASSIUM SERPL-SCNC: 3.2 MMOL/L (ref 3.6–5)
POTASSIUM SERPL-SCNC: 4 MMOL/L (ref 3.6–5)
PROT SERPL-MCNC: 6.2 G/DL (ref 6.3–8.2)
RBC # BLD AUTO: 2.26 10^6/UL (ref 4.35–5.55)
RBC # BLD AUTO: 2.46 10^6/UL (ref 4.35–5.55)
TOTAL CELLS COUNTED % (AUTO): 100 %
VANCOMYCIN,TROUGH: 10.3 UG/ML (ref 5–20)
WBC # BLD AUTO: 0.2 10^3/UL (ref 4–10.5)
WBC # BLD AUTO: 0.2 10^3/UL (ref 4–10.5)

## 2020-04-27 RX ADMIN — VANCOMYCIN HYDROCHLORIDE SCH MLS/HR: 1 INJECTION, POWDER, LYOPHILIZED, FOR SOLUTION INTRAVENOUS at 18:25

## 2020-04-27 RX ADMIN — ACETAMINOPHEN PRN MG: 325 TABLET ORAL at 16:51

## 2020-04-27 RX ADMIN — OLANZAPINE SCH MG: 5 TABLET, FILM COATED ORAL at 21:03

## 2020-04-27 RX ADMIN — VALACYCLOVIR HYDROCHLORIDE SCH MG: 500 TABLET, FILM COATED ORAL at 10:41

## 2020-04-27 RX ADMIN — CEFEPIME SCH MLS/HR: 2 INJECTION, POWDER, FOR SOLUTION INTRAMUSCULAR; INTRAVENOUS at 14:56

## 2020-04-27 RX ADMIN — VANCOMYCIN HYDROCHLORIDE SCH MLS/HR: 1 INJECTION, POWDER, LYOPHILIZED, FOR SOLUTION INTRAVENOUS at 05:56

## 2020-04-27 RX ADMIN — FILGRASTIM SCH MCG: 300 INJECTION, SOLUTION INTRAVENOUS; SUBCUTANEOUS at 10:40

## 2020-04-27 RX ADMIN — INSULIN LISPRO SCH UNIT: 100 INJECTION, SOLUTION INTRAVENOUS; SUBCUTANEOUS at 11:46

## 2020-04-27 RX ADMIN — INSULIN GLARGINE SCH UNIT: 100 INJECTION, SOLUTION SUBCUTANEOUS at 17:06

## 2020-04-27 RX ADMIN — DOCUSATE SODIUM SCH MG: 50 LIQUID ORAL at 10:40

## 2020-04-27 RX ADMIN — INSULIN LISPRO SCH UNIT: 100 INJECTION, SOLUTION INTRAVENOUS; SUBCUTANEOUS at 17:05

## 2020-04-27 RX ADMIN — CEFEPIME SCH MLS/HR: 2 INJECTION, POWDER, FOR SOLUTION INTRAMUSCULAR; INTRAVENOUS at 21:03

## 2020-04-27 RX ADMIN — SERTRALINE HYDROCHLORIDE SCH MG: 50 TABLET ORAL at 21:03

## 2020-04-27 RX ADMIN — POTASSIUM CHLORIDE SCH MLS/HR: 29.8 INJECTION, SOLUTION INTRAVENOUS at 15:33

## 2020-04-27 RX ADMIN — CEFEPIME SCH MLS/HR: 2 INJECTION, POWDER, FOR SOLUTION INTRAMUSCULAR; INTRAVENOUS at 05:56

## 2020-04-27 RX ADMIN — ACETAMINOPHEN PRN MG: 325 TABLET ORAL at 02:33

## 2020-04-27 RX ADMIN — INSULIN LISPRO SCH UNIT: 100 INJECTION, SOLUTION INTRAVENOUS; SUBCUTANEOUS at 23:58

## 2020-04-27 RX ADMIN — POTASSIUM CHLORIDE SCH MLS/HR: 29.8 INJECTION, SOLUTION INTRAVENOUS at 14:01

## 2020-04-27 RX ADMIN — INSULIN LISPRO SCH UNIT: 100 INJECTION, SOLUTION INTRAVENOUS; SUBCUTANEOUS at 06:29

## 2020-04-27 NOTE — PDOC PROGRESS REPORT
Subjective


Progress Note for:: 04/27/20


Subjective:: 


Discussed case w/ his ICU nurse, pt seems to be a bit better today





Reason For Visit: 


HEMOPTYSIS








Physical Exam


Vital Signs: 


                                        











Temp Pulse Resp BP Pulse Ox


 


 98.6 F   92   26 H  104/56 L  98 


 


 04/27/20 08:00  04/26/20 20:49  04/27/20 08:25  04/27/20 08:25  04/27/20 08:25








                                 Intake & Output











 04/26/20 04/27/20 04/28/20





 06:59 06:59 06:59


 


Intake Total 1744 1295 


 


Output Total  1935 60


 


Balance 1744 -640 -60


 


Weight 63.5 kg 71.6 kg 














Results


Laboratory Results: 


                                        





                                 04/27/20 04:53 





                                 04/27/20 04:53 





                                        











  04/25/20 04/26/20 04/27/20





  21:36 22:10 04:53


 


WBC   0.3 L* 


 


RBC   2.21 L 


 


Hgb   7.9 L 


 


Hct   21.2 L 


 


MCV   96  D 


 


MCH   35.9 H 


 


MCHC   37.4 H 


 


RDW   17.3 H 


 


Plt Count   62 L 


 


Seg Neutrophils %   6.9 L 


 


Sodium    131.4 L


 


Potassium    3.2 L


 


Chloride    101


 


Carbon Dioxide    22


 


Anion Gap    8


 


BUN    29 H


 


Creatinine    0.92


 


Est GFR ( Amer)    > 60


 


Glucose    258 H


 


Calcium    8.6


 


Total Bilirubin    2.0 H


 


AST    22


 


Alkaline Phosphatase    63


 


Total Protein    6.2 L


 


Albumin    3.0 L


 


Blood Type  A POSITIVE  


 


Antibody Screen  NEGATIVE  














  04/27/20





  04:53


 


WBC  0.2 L*


 


RBC  2.26 L


 


Hgb  8.0 L


 


Hct  21.4 L


 


MCV  95


 


MCH  35.2 H


 


MCHC  37.2 H


 


RDW  17.2 H


 


Plt Count  57 L


 


Seg Neutrophils %  6.8 L


 


Sodium 


 


Potassium 


 


Chloride 


 


Carbon Dioxide 


 


Anion Gap 


 


BUN 


 


Creatinine 


 


Est GFR (African Amer) 


 


Glucose 


 


Calcium 


 


Total Bilirubin 


 


AST 


 


Alkaline Phosphatase 


 


Total Protein 


 


Albumin 


 


Blood Type 


 


Antibody Screen 








                                        





04/25/20 12:50   Blood   Blood Culture (PCR) - Final


                            Pseudomonas Aeruginosa








Impressions: 


                                        





Abdomen/Pelvis CT  04/25/20 00:00


IMPRESSION:  NO SIGNIFICANT OR ACUTE FINDING IN THE ABDOMEN OR PELVIS ON CT SCAN

WITH IV CONTRAST.


 








Chest CT  04/25/20 00:00


IMPRESSION:


1. 1 CM NODULE IN THE LEFT LOWER LOBE.  IF THE PATIENT HAS HAD PREVIOUS CHEST CT

AT ANOTHER FACILITY, THEN RECOMMEND COMPARISON.  IF NO PRIOR STUDIES, THEN MAY 

CONSIDER PET SCAN.


2. NO OTHER SIGNIFICANT FINDINGS IN THE CHEST.


 








Chest X-Ray  04/25/20 12:48


IMPRESSION:  NO ACUTE RADIOGRAPHIC FINDING IN THE CHEST.


 








Upper Extremity CT  04/26/20 00:00


IMPRESSION:  No evidence of abscess or osteomyelitis.


 














Assessment & Plan





- Diagnosis


(1) Leukemia


Qualifiers: 


   Leukemia type: myeloid   Myeloid leukemia type: acute   Leukemia 

Active/Remission status: relapsed   Qualified Code(s): C92.02 - Acute 

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 

dysplasia, in relapse   


Is this a current diagnosis for this admission?: Yes   


Plan: 


Hb stable post tx, he was supposed to start VIDAZA today but he can't start 

until counts fully recover and pt is d/c'd home. He seems better today, Will 

follow. Keep hb >7 and plt >50 (as long as hemoptysis present)








- Time


Time Spent with patient: 15-24 minutes

## 2020-04-27 NOTE — PDOC CRITICAL CARE PROG REPORT
General


Date:: 04/27/20


ICU Day:: 1


Resuscitation Status: Other - Patient and family have expressed wishes to have a

DNR status.  He was made full code to facilitate intubation for transport when h

e was having issues with hemoptysis.  These issues have since subsided.


Review of systems relevant to events:: 





Please see complete review of systems below.


Reason for ICU Addmission:: Severe Hemoptysis





- Medications:


Medications reviewed and adjusted accordingly: Yes





Physical Exam


Vital Signs: 


                                        











Temp Pulse Resp BP Pulse Ox


 


 100.1 F   70   15   161/63 H  98 


 


 04/27/20 16:00  04/27/20 08:49  04/27/20 16:25  04/27/20 16:25  04/27/20 16:25








                                 Intake & Output











 04/26/20 04/27/20 04/28/20





 06:59 06:59 06:59


 


Intake Total 1744 1345 1338


 


Output Total  1935 520


 


Balance 1744 -590 818


 


Weight 63.5 kg 71.6 kg 71.6 kg








                                  Weight/Height





Weight                           71.6 kg


Height                           5 ft 8 in








General appearance: PRESENT: no acute distress


Head exam: PRESENT: atraumatic


Eye exam: PRESENT: EOMI, PERRLA


Ear exam: ABSENT: bleeding


Mouth exam: PRESENT: moist


Neck exam: PRESENT: full ROM


Respiratory exam: PRESENT: other - Mild scattered rhonchi..  ABSENT: accessory m

uscle use


Cardiovascular exam: PRESENT: RRR, +S1, +S2


Pulses: PRESENT: normal carotid pulses, +2 pedal pulses bilateral


Vascular exam: PRESENT: normal capillary refill


GI/Abdominal exam: PRESENT: normal bowel sounds, soft.  ABSENT: tenderness


Extremities exam: PRESENT: joint swelling, other - Left lateral arm swelling.  

No significant change.


Musculoskeletal exam: PRESENT: full ROM, normal inspection.  ABSENT: tenderness


Neurological exam: PRESENT: alert, awake, oriented to person, oriented to place,

oriented to time, oriented to situation, CN II-XII grossly intact


Psychiatric exam: PRESENT: appropriate affect


Skin exam: PRESENT: other - Left lateral arm swelling with mild drainage.  No 

significant change.





Laboratory/Radiographs


Laboratory Results: 


                                        





                                 04/27/20 12:20 





                                 04/27/20 04:53 





                                        











  04/25/20 04/26/20 04/27/20





  21:36 22:10 04:53


 


WBC   0.3 L* 


 


RBC   2.21 L 


 


Hgb   7.9 L 


 


Hct   21.2 L 


 


MCV   96  D 


 


MCH   35.9 H 


 


MCHC   37.4 H 


 


RDW   17.3 H 


 


Plt Count   62 L 


 


Seg Neutrophils %   6.9 L 


 


Sodium    131.4 L


 


Potassium    3.2 L


 


Chloride    101


 


Carbon Dioxide    22


 


Anion Gap    8


 


BUN    29 H


 


Creatinine    0.92


 


Est GFR ( Amer)    > 60


 


Glucose    258 H


 


Calcium    8.6


 


Total Bilirubin    2.0 H


 


AST    22


 


Alkaline Phosphatase    63


 


Total Protein    6.2 L


 


Albumin    3.0 L


 


Blood Type  A POSITIVE  


 


Antibody Screen  NEGATIVE  














  04/27/20 04/27/20





  04:53 12:20


 


WBC  0.2 L*  0.2 L*


 


RBC  2.26 L  2.46 L


 


Hgb  8.0 L  8.6 L


 


Hct  21.4 L  23.7 L


 


MCV  95  96


 


MCH  35.2 H  35.0 H


 


MCHC  37.2 H  36.3 H


 


RDW  17.2 H  17.5 H


 


Plt Count  57 L  60 L


 


Seg Neutrophils %  6.8 L 


 


Sodium  


 


Potassium  


 


Chloride  


 


Carbon Dioxide  


 


Anion Gap  


 


BUN  


 


Creatinine  


 


Est GFR (African Amer)  


 


Glucose  


 


Calcium  


 


Total Bilirubin  


 


AST  


 


Alkaline Phosphatase  


 


Total Protein  


 


Albumin  


 


Blood Type  


 


Antibody Screen  








                                        





04/25/20 12:50   Blood   Blood Culture (PCR) - Final


                            Pseudomonas Aeruginosa








Impressions: 


                                        





Abdomen/Pelvis CT  04/25/20 00:00


IMPRESSION:  NO SIGNIFICANT OR ACUTE FINDING IN THE ABDOMEN OR PELVIS ON CT SCAN

WITH IV CONTRAST.


 








Chest CT  04/25/20 00:00


IMPRESSION:


1. 1 CM NODULE IN THE LEFT LOWER LOBE.  IF THE PATIENT HAS HAD PREVIOUS CHEST CT

AT ANOTHER FACILITY, THEN RECOMMEND COMPARISON.  IF NO PRIOR STUDIES, THEN MAY 

CONSIDER PET SCAN.


2. NO OTHER SIGNIFICANT FINDINGS IN THE CHEST.


 








Chest X-Ray  04/25/20 12:48


IMPRESSION:  NO ACUTE RADIOGRAPHIC FINDING IN THE CHEST.


 








Upper Extremity CT  04/26/20 00:00


IMPRESSION:  No evidence of abscess or osteomyelitis.


 











All labs, radiographs, diagnostic studies and EKGs were personally reviewed: Yes


In addition, reports of radiographic and diagnostic studies were read: Yes





Assessment and Plan





- Diagnosis


(1) Hemoptysis


Is this a current diagnosis for this admission?: Yes   


Plan: 


No further hemoptysis since yesterday morning.








(2) Neutropenic fever


Is this a current diagnosis for this admission?: Yes   


Plan: 


Patient was intermittently mildly febrile today.  Resolved with Tylenol.








(3) Diabetes mellitus


Qualifiers: 


   Diabetes mellitus type: type 2   Diabetes mellitus long term insulin use: 

with long term use 


Is this a current diagnosis for this admission?: Yes   


Plan: 


Continue regular insulin sliding scale.





Plan Summary: 


ICU day: 1





Neuro: Patient is awake and alert and seems to understand his condition.  He was

a bit confused and did not realize he was in the intensive care unit but 

understood that he was in the hospital for issues relating to his chemotherapy.


Pulmonary: Patient was initially admitted for airway management.  Not found to 

have any significant hemoptysis after platelet administration yesterday.  I do 

not anticipate any further bleeding now that his platelet level has improved.  

He may have some scattered rhonchi and would be at risk of developing pneumonia 

given his poor immunity and residual blood in his bronchial system.  Patient 

weaned off of all supplemental oxygen.


Cardiovascular: No significant cardiovascular history.  No apparent issues.  

Blood pressure and heart rate have remained stable.


Heme: H&H has improved significantly since product administration yesterday.  

H&H: 8.6/23.7.  Platelet count up to 60.


Renal: Hypokalemic this morning.  Potassium repletion held until repeat CBC to 

first determine whether more blood products were required.  As he no longer 

needs further PRBCs, we will obtain repeat renal panel and likely need to 

replete potassium.


Gastrointestinal: Patient had difficulty swallowing today and should remain on 

aspiration precautions with formal swallow study


ID: Neutropenic fever.  Patient continues on valacyclovir, vancomycin and 

cefepime.








Barriers to discharge from ICU: Patient is appropriate to transfer from the 

intensive care unit back to a medicine floor.  As above, I would place on 

aspiration precautions and study further.  No further hemoptysis.  I would not 

recommend transfer to an outside intensive care unit at this time and would not 

recommend intubation for transfer given that his hemoptysis subsided.  We will 

need to monitor platelet level and transfuse if necessary.











Critical Time


Critical Time (minutes): 45


Level of Care: ICU


-: 


1.  The care of a critical patient is a dynamic process.  This note is a 

representative synopsis but static in nature.  The timeframe for treatments 

given in order is not necessarily the actual time these treatments may have been

done.





2.  This patient requires critical care secondary to ongoing requirements for th

erapy not offered or safe outside the critical care environment.  Transfer to a 

lower level of care will result in altered life or limb morbidity and mortality.





3.  Multidisciplinary rounds completed.





4.  ABCDE bundle addressed.

## 2020-04-28 LAB
ADD MANUAL DIFF: (no result)
ADD MANUAL DIFF: (no result)
ANISOCYTOSIS BLD QL SMEAR: (no result)
ANISOCYTOSIS BLD QL SMEAR: (no result)
BASOPHILS # BLD AUTO: 0 10^3/UL (ref 0–0.2)
BASOPHILS # BLD AUTO: 0 10^3/UL (ref 0–0.2)
BASOPHILS NFR BLD AUTO: 0 % (ref 0–2)
BASOPHILS NFR BLD AUTO: 1.2 % (ref 0–2)
DACRYOCYTES BLD QL SMEAR: SLIGHT
EOSINOPHIL # BLD AUTO: 0 10^3/UL (ref 0–0.6)
EOSINOPHIL # BLD AUTO: 0 10^3/UL (ref 0–0.6)
EOSINOPHIL NFR BLD AUTO: 11.4 % (ref 0–6)
EOSINOPHIL NFR BLD AUTO: 11.9 % (ref 0–6)
ERYTHROCYTE [DISTWIDTH] IN BLOOD BY AUTOMATED COUNT: 16.9 % (ref 11.5–14)
ERYTHROCYTE [DISTWIDTH] IN BLOOD BY AUTOMATED COUNT: 17.2 % (ref 11.5–14)
HCT VFR BLD CALC: 21.5 % (ref 37.9–51)
HCT VFR BLD CALC: 21.5 % (ref 37.9–51)
HGB BLD-MCNC: 8 G/DL (ref 13.5–17)
HGB BLD-MCNC: 8.2 G/DL (ref 13.5–17)
LYMPHOCYTES # BLD AUTO: 0.1 10^3/UL (ref 0.5–4.7)
LYMPHOCYTES # BLD AUTO: 0.2 10^3/UL (ref 0.5–4.7)
LYMPHOCYTES NFR BLD AUTO: 71.1 % (ref 13–45)
LYMPHOCYTES NFR BLD AUTO: 78.1 % (ref 13–45)
MCH RBC QN AUTO: 35.7 PG (ref 27–33.4)
MCH RBC QN AUTO: 36.9 PG (ref 27–33.4)
MCHC RBC AUTO-ENTMCNC: 37 G/DL (ref 32–36)
MCHC RBC AUTO-ENTMCNC: 37.8 G/DL (ref 32–36)
MCV RBC AUTO: 96 FL (ref 80–97)
MCV RBC AUTO: 98 FL (ref 80–97)
MONOCYTES # BLD AUTO: 0 10^3/UL (ref 0.1–1.4)
MONOCYTES # BLD AUTO: 0 10^3/UL (ref 0.1–1.4)
MONOCYTES NFR BLD AUTO: 10.2 % (ref 3–13)
MONOCYTES NFR BLD AUTO: 4.4 % (ref 3–13)
NEUTROPHILS # BLD AUTO: 0 10^3/UL (ref 1.7–8.2)
NEUTROPHILS # BLD AUTO: 0 10^3/UL (ref 1.7–8.2)
NEUTS SEG NFR BLD AUTO: 5.6 % (ref 42–78)
NEUTS SEG NFR BLD AUTO: 6.1 % (ref 42–78)
OVALOCYTES BLD QL SMEAR: SLIGHT
OVALOCYTES BLD QL SMEAR: SLIGHT
PLATELET # BLD: 39 10^3/UL (ref 150–450)
PLATELET # BLD: 47 10^3/UL (ref 150–450)
PLATELET COMMENT: (no result)
PLATELET COMMENT: (no result)
POIKILOCYTOSIS BLD QL SMEAR: SLIGHT
POIKILOCYTOSIS BLD QL SMEAR: SLIGHT
RBC # BLD AUTO: 2.21 10^6/UL (ref 4.35–5.55)
RBC # BLD AUTO: 2.23 10^6/UL (ref 4.35–5.55)
SCHISTOCYTES BLD QL SMEAR: SLIGHT
TOTAL CELLS COUNTED % (AUTO): 100 %
TOTAL CELLS COUNTED % (AUTO): 100 %
WBC # BLD AUTO: 0.2 10^3/UL (ref 4–10.5)
WBC # BLD AUTO: 0.2 10^3/UL (ref 4–10.5)

## 2020-04-28 RX ADMIN — INSULIN GLARGINE SCH UNIT: 100 INJECTION, SOLUTION SUBCUTANEOUS at 17:55

## 2020-04-28 RX ADMIN — CEFEPIME SCH MLS/HR: 2 INJECTION, POWDER, FOR SOLUTION INTRAMUSCULAR; INTRAVENOUS at 06:47

## 2020-04-28 RX ADMIN — VALACYCLOVIR HYDROCHLORIDE SCH MG: 500 TABLET, FILM COATED ORAL at 10:25

## 2020-04-28 RX ADMIN — CEFEPIME SCH MLS/HR: 2 INJECTION, POWDER, FOR SOLUTION INTRAMUSCULAR; INTRAVENOUS at 13:26

## 2020-04-28 RX ADMIN — INSULIN LISPRO SCH UNIT: 100 INJECTION, SOLUTION INTRAVENOUS; SUBCUTANEOUS at 17:31

## 2020-04-28 RX ADMIN — INSULIN LISPRO SCH UNIT: 100 INJECTION, SOLUTION INTRAVENOUS; SUBCUTANEOUS at 11:32

## 2020-04-28 RX ADMIN — ACETAMINOPHEN PRN MG: 325 TABLET ORAL at 15:29

## 2020-04-28 RX ADMIN — DOCUSATE SODIUM SCH MG: 50 LIQUID ORAL at 10:25

## 2020-04-28 RX ADMIN — INSULIN LISPRO SCH UNIT: 100 INJECTION, SOLUTION INTRAVENOUS; SUBCUTANEOUS at 06:48

## 2020-04-28 RX ADMIN — OLANZAPINE SCH MG: 5 TABLET, FILM COATED ORAL at 21:53

## 2020-04-28 RX ADMIN — SERTRALINE HYDROCHLORIDE SCH MG: 50 TABLET ORAL at 21:53

## 2020-04-28 RX ADMIN — INSULIN LISPRO SCH UNIT: 100 INJECTION, SOLUTION INTRAVENOUS; SUBCUTANEOUS at 21:52

## 2020-04-28 RX ADMIN — FILGRASTIM SCH MCG: 300 INJECTION, SOLUTION INTRAVENOUS; SUBCUTANEOUS at 10:25

## 2020-04-28 RX ADMIN — CEFEPIME SCH MLS/HR: 2 INJECTION, POWDER, FOR SOLUTION INTRAMUSCULAR; INTRAVENOUS at 21:53

## 2020-04-28 NOTE — PDOC PROGRESS REPORT
Subjective


Progress Note for:: 04/28/20


Subjective:: 





No adverse events overnight.  Had a temperature of 100 F this morning.  He said

he feels pretty good today.  Appetite is fair.


Reason For Visit: 


HEMOPTYSIS








Physical Exam


Vital Signs: 


                                        











Temp Pulse Resp BP Pulse Ox


 


 99.2 F   84   16   132/53 H  98 


 


 04/28/20 08:00  04/28/20 08:00  04/28/20 08:00  04/28/20 08:00  04/28/20 08:00








                                 Intake & Output











 04/27/20 04/28/20 04/29/20





 06:59 06:59 06:59


 


Intake Total 1345 2048 660


 


Output Total 1935 1595 650


 


Balance -590 453 10


 


Weight 71.6 kg 75.7 kg 











General appearance: PRESENT: no acute distress, cooperative, disheveled


Respiratory exam: PRESENT: clear to auscultation mary, symmetrical, unlabored.  

ABSENT: accessory muscle use, chest wall tenderness, crackles, prolonged 

expiratory phas, retraction, rhonchi, tachypnea, wheezes


Cardiovascular exam: PRESENT: RRR, +S1, +S2


Pulses: PRESENT: normal carotid pulses


Vascular exam: PRESENT: normal capillary refill


GI/Abdominal exam: PRESENT: normal bowel sounds, soft.  ABSENT: distended, 

guarding, rebound, tenderness


Extremities exam: ABSENT: clubbing, pedal edema


Musculoskeletal exam: PRESENT: normal inspection.  ABSENT: deformity


Neurological exam: PRESENT: alert, awake, oriented to person, oriented to place,

oriented to situation


Psychiatric exam: PRESENT: appropriate affect, normal mood


Skin exam: PRESENT: dry, warm





Results


Laboratory Results: 


                                        





                                 04/28/20 11:27 





                                 04/27/20 17:42 





                                        











  04/27/20 04/27/20 04/28/20





  17:42 17:42 04:43


 


WBC    0.2 L*


 


RBC    2.21 L


 


Hgb    8.2 L


 


Hct    21.5 L


 


MCV    98 H


 


MCH    36.9 H


 


MCHC    37.8 H


 


RDW    16.9 H


 


Plt Count    47 L


 


Seg Neutrophils %    6.1 L


 


Sodium   128.5 L 


 


Potassium   4.0 


 


Chloride   98 


 


Carbon Dioxide   22 


 


Anion Gap   9 


 


BUN   27 H 


 


Creatinine  Cancelled  0.85 


 


Est GFR ( Amer)  Cancelled  > 60 


 


Est GFR (Non-Af Amer)  Cancelled  


 


Glucose   334 H 


 


Calcium   8.4 














  04/28/20 04/28/20





  10:10 11:27


 


WBC  Cancelled  0.2 L*


 


RBC  Cancelled  2.23 L


 


Hgb  Cancelled  8.0 L


 


Hct  Cancelled  21.5 L


 


MCV  Cancelled  96


 


MCH  Cancelled  35.7 H


 


MCHC  Cancelled  37.0 H


 


RDW  Cancelled  17.2 H


 


Plt Count  Cancelled  39 L


 


Seg Neutrophils %  Cancelled  5.6 L


 


Sodium  


 


Potassium  


 


Chloride  


 


Carbon Dioxide  


 


Anion Gap  


 


BUN  


 


Creatinine  


 


Est GFR ( Amer)  


 


Est GFR (Non-Af Amer)  


 


Glucose  


 


Calcium  








                                        





04/25/20 12:50   Blood   Blood Culture (PCR) - Final


                            Pseudomonas Aeruginosa


04/25/20 12:50   Blood   Blood Culture - Final


                            Pseudomonas Aeruginosa


04/25/20 21:37   Sputum   Gram Stain - Final


04/25/20 21:37   Sputum   Sputum Culture - Final


                            Pseudomonas Aeruginosa


                            C.albicans/C.dubliniensis


                            Normal [place] Absent








Impressions: 


                                        





Abdomen/Pelvis CT  04/25/20 00:00


IMPRESSION:  NO SIGNIFICANT OR ACUTE FINDING IN THE ABDOMEN OR PELVIS ON CT SCAN

WITH IV CONTRAST.


 








Chest CT  04/25/20 00:00


IMPRESSION:


1. 1 CM NODULE IN THE LEFT LOWER LOBE.  IF THE PATIENT HAS HAD PREVIOUS CHEST CT

AT ANOTHER FACILITY, THEN RECOMMEND COMPARISON.  IF NO PRIOR STUDIES, THEN MAY 

CONSIDER PET SCAN.


2. NO OTHER SIGNIFICANT FINDINGS IN THE CHEST.


 








Chest X-Ray  04/25/20 12:48


IMPRESSION:  NO ACUTE RADIOGRAPHIC FINDING IN THE CHEST.


 








Upper Extremity CT  04/26/20 00:00


IMPRESSION:  No evidence of abscess or osteomyelitis.


 














Assessment and Plan





- Diagnosis


(1) Pseudomonas pneumonia


Qualifiers: 


   Laterality: unspecified laterality   Lung location: unspecified part of lung 

 Qualified Code(s): J15.1 - Pneumonia due to Pseudomonas   


Is this a current diagnosis for this admission?: Yes   


Plan: 


He grew a pansensitive Pseudomonas out of his sputum culture.  Currently on 

cefepime.  I would like to switch him over to fluoroquinolone but apparently he 

has had a very bad reaction to Levaquin in the past.








(2) Acute respiratory failure with hypoxemia


Is this a current diagnosis for this admission?: Yes   


Plan: 


Resolved








(3) Leukemia


Qualifiers: 


   Leukemia type: myeloid   Myeloid leukemia type: acute   Leukemia Active/R

emission status: relapsed   Qualified Code(s): C92.02 - Acute myeloblastic 

leukemia, in relapse; C92.62 - Acute myeloid leukemia with [id number] in 

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

  


Is this a current diagnosis for this admission?: Yes   


Plan: 





Unknown type of leukemia as nothing is documented in the chart


Currently undergoing chemotherapy per patient for the last 5 to 6 months, last 

chemo was 1 week PTA


Oncology consulted on admission








(4) Neutropenic fever


Is this a current diagnosis for this admission?: Yes   


Plan: 


Currently on cefepime, oncology has been consulted, he is on Neupogen








(5) Pseudomonal bacteremia


Is this a current diagnosis for this admission?: Yes   


Plan: 


Secondary to his Pseudomonas pneumonia.  On cefepime as noted above.








- Time


Time Spent with patient: 15-24 minutes

## 2020-04-28 NOTE — PDOC PROGRESS REPORT
Subjective


Progress Note for:: 04/28/20


Subjective:: 


Pt is physically better per nursing today. STill w/ low grade temp. Also having 

some swallowing issues





Reason For Visit: 


HEMOPTYSIS








Physical Exam


Vital Signs: 


                                        











Temp Pulse Resp BP Pulse Ox


 


 99.2 F   84   16   132/53 H  98 


 


 04/28/20 08:00  04/28/20 08:00  04/28/20 08:00  04/28/20 08:00  04/28/20 08:00








                                 Intake & Output











 04/27/20 04/28/20 04/29/20





 06:59 06:59 06:59


 


Intake Total 1345 2048 300


 


Output Total 1935 1595 


 


Balance -590 453 300


 


Weight 71.6 kg 75.7 kg 














Results


Laboratory Results: 


                                        





                                 04/28/20 04:43 





                                 04/27/20 17:42 





                                        











  04/27/20 04/27/20 04/27/20





  12:20 17:42 17:42


 


WBC  0.2 L*  


 


RBC  2.46 L  


 


Hgb  8.6 L  


 


Hct  23.7 L  


 


MCV  96  


 


MCH  35.0 H  


 


MCHC  36.3 H  


 


RDW  17.5 H  


 


Plt Count  60 L  


 


Seg Neutrophils %   


 


Sodium    128.5 L


 


Potassium    4.0


 


Chloride    98


 


Carbon Dioxide    22


 


Anion Gap    9


 


BUN    27 H


 


Creatinine   Cancelled  0.85


 


Est GFR ( Amer)   Cancelled  > 60


 


Est GFR (Non-Af Amer)   Cancelled 


 


Glucose    334 H


 


Calcium    8.4














  04/28/20





  04:43


 


WBC  0.2 L*


 


RBC  2.21 L


 


Hgb  8.2 L


 


Hct  21.5 L


 


MCV  98 H


 


MCH  36.9 H


 


MCHC  37.8 H


 


RDW  16.9 H


 


Plt Count  47 L


 


Seg Neutrophils %  6.1 L


 


Sodium 


 


Potassium 


 


Chloride 


 


Carbon Dioxide 


 


Anion Gap 


 


BUN 


 


Creatinine 


 


Est GFR ( Amer) 


 


Est GFR (Non-Af Amer) 


 


Glucose 


 


Calcium 








                                        





04/25/20 12:50   Blood   Blood Culture (PCR) - Final


                            Pseudomonas Aeruginosa


04/25/20 12:50   Blood   Blood Culture - Final


                            Pseudomonas Aeruginosa


04/25/20 21:37   Sputum   Gram Stain - Final


04/25/20 21:37   Sputum   Sputum Culture - Final


                            Pseudomonas Aeruginosa


                            C.albicans/C.dubliniensis


                            Normal [place] Absent








Impressions: 


                                        





Abdomen/Pelvis CT  04/25/20 00:00


IMPRESSION:  NO SIGNIFICANT OR ACUTE FINDING IN THE ABDOMEN OR PELVIS ON CT SCAN

WITH IV CONTRAST.


 








Chest CT  04/25/20 00:00


IMPRESSION:


1. 1 CM NODULE IN THE LEFT LOWER LOBE.  IF THE PATIENT HAS HAD PREVIOUS CHEST CT

AT ANOTHER FACILITY, THEN RECOMMEND COMPARISON.  IF NO PRIOR STUDIES, THEN MAY 

CONSIDER PET SCAN.


2. NO OTHER SIGNIFICANT FINDINGS IN THE CHEST.


 








Chest X-Ray  04/25/20 12:48


IMPRESSION:  NO ACUTE RADIOGRAPHIC FINDING IN THE CHEST.


 








Upper Extremity CT  04/26/20 00:00


IMPRESSION:  No evidence of abscess or osteomyelitis.


 














Assessment & Plan





- Diagnosis


(1) Leukemia


Qualifiers: 


   Leukemia type: myeloid   Myeloid leukemia type: acute   Leukemia 

Active/Remission status: relapsed   Qualified Code(s): C92.02 - Acute 

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 

dysplasia, in relapse   


Is this a current diagnosis for this admission?: Yes   


Plan: 


Holding all therapy, cont neupogen.








(2) Pseudomonas sepsis


Qualifiers: 


   Sepsis acute organ dysfunction status: without acute organ dysfunction   

Qualified Code(s): A41.52 - Sepsis due to Pseudomonas   


Is this a current diagnosis for this admission?: Yes   


Plan: 


Pansensitive, d/c vancomycin today, repeat bld cx to see if if cleared, cont 

cefepime. Told nursing to add levaquin allergy to profile, he had seizures w/ 

levaquin in past given by our office. He should not received that drug any 

longer.








(3) Pancytopenia


Is this a current diagnosis for this admission?: Yes   


Plan: 


plt decreased, monitor daily, no need for transfusion right now








(4) Dysphagia


Qualifiers: 


   Dysphagia type: oropharyngeal phase   Qualified Code(s): R13.12 - Dysphagia, 

oropharyngeal phase   


Is this a current diagnosis for this admission?: Yes   


Plan: 


Has some dysphagia long standing, worsened here, had nursing put in speech eval 

for pt








(5) Physical deconditioning


Is this a current diagnosis for this admission?: Yes   


Plan: 


related to admission, placed PT consult








- Time


Time Spent with patient: 15-24 minutes

## 2020-04-29 LAB
ADD MANUAL DIFF: (no result)
ANISOCYTOSIS BLD QL SMEAR: (no result)
BASOPHILS # BLD AUTO: 0 10^3/UL (ref 0–0.2)
BASOPHILS NFR BLD AUTO: 0 % (ref 0–2)
EOSINOPHIL # BLD AUTO: 0 10^3/UL (ref 0–0.6)
EOSINOPHIL NFR BLD AUTO: 17.6 % (ref 0–6)
ERYTHROCYTE [DISTWIDTH] IN BLOOD BY AUTOMATED COUNT: 17 % (ref 11.5–14)
HCT VFR BLD CALC: 21.7 % (ref 37.9–51)
HGB BLD-MCNC: 8 G/DL (ref 13.5–17)
LYMPHOCYTES # BLD AUTO: 0.2 10^3/UL (ref 0.5–4.7)
LYMPHOCYTES NFR BLD AUTO: 60.4 % (ref 13–45)
MCH RBC QN AUTO: 35.3 PG (ref 27–33.4)
MCHC RBC AUTO-ENTMCNC: 36.6 G/DL (ref 32–36)
MCV RBC AUTO: 97 FL (ref 80–97)
MONOCYTES # BLD AUTO: 0 10^3/UL (ref 0.1–1.4)
MONOCYTES NFR BLD AUTO: 13.1 % (ref 3–13)
NEUTROPHILS # BLD AUTO: 0 10^3/UL (ref 1.7–8.2)
NEUTS SEG NFR BLD AUTO: 8.9 % (ref 42–78)
OVALOCYTES BLD QL SMEAR: SLIGHT
PLATELET # BLD: 30 10^3/UL (ref 150–450)
PLATELET COMMENT: (no result)
POIKILOCYTOSIS BLD QL SMEAR: SLIGHT
RBC # BLD AUTO: 2.25 10^6/UL (ref 4.35–5.55)
TOTAL CELLS COUNTED % (AUTO): 100 %
WBC # BLD AUTO: 0.3 10^3/UL (ref 4–10.5)

## 2020-04-29 RX ADMIN — INSULIN GLARGINE SCH UNIT: 100 INJECTION, SOLUTION SUBCUTANEOUS at 18:46

## 2020-04-29 RX ADMIN — INSULIN LISPRO SCH UNIT: 100 INJECTION, SOLUTION INTRAVENOUS; SUBCUTANEOUS at 12:13

## 2020-04-29 RX ADMIN — ACETAMINOPHEN PRN MG: 325 TABLET ORAL at 16:18

## 2020-04-29 RX ADMIN — OLANZAPINE SCH MG: 5 TABLET, FILM COATED ORAL at 21:38

## 2020-04-29 RX ADMIN — INSULIN LISPRO SCH UNIT: 100 INJECTION, SOLUTION INTRAVENOUS; SUBCUTANEOUS at 16:19

## 2020-04-29 RX ADMIN — VALACYCLOVIR HYDROCHLORIDE SCH MG: 500 TABLET, FILM COATED ORAL at 09:48

## 2020-04-29 RX ADMIN — CEFEPIME SCH MLS/HR: 2 INJECTION, POWDER, FOR SOLUTION INTRAMUSCULAR; INTRAVENOUS at 21:38

## 2020-04-29 RX ADMIN — DOCUSATE SODIUM SCH MG: 100 CAPSULE, LIQUID FILLED ORAL at 09:47

## 2020-04-29 RX ADMIN — CEFEPIME SCH MLS/HR: 2 INJECTION, POWDER, FOR SOLUTION INTRAMUSCULAR; INTRAVENOUS at 05:50

## 2020-04-29 RX ADMIN — SERTRALINE HYDROCHLORIDE SCH MG: 50 TABLET ORAL at 21:39

## 2020-04-29 RX ADMIN — INSULIN LISPRO SCH UNIT: 100 INJECTION, SOLUTION INTRAVENOUS; SUBCUTANEOUS at 07:32

## 2020-04-29 RX ADMIN — CEFEPIME SCH MLS/HR: 2 INJECTION, POWDER, FOR SOLUTION INTRAMUSCULAR; INTRAVENOUS at 14:52

## 2020-04-29 RX ADMIN — FILGRASTIM SCH MCG: 300 INJECTION, SOLUTION INTRAVENOUS; SUBCUTANEOUS at 09:47

## 2020-04-29 RX ADMIN — INSULIN LISPRO SCH UNIT: 100 INJECTION, SOLUTION INTRAVENOUS; SUBCUTANEOUS at 21:38

## 2020-04-29 RX ADMIN — INSULIN LISPRO SCH UNIT: 100 INJECTION, SOLUTION INTRAVENOUS; SUBCUTANEOUS at 12:12

## 2020-04-29 RX ADMIN — ACETAMINOPHEN PRN MG: 325 TABLET ORAL at 05:55

## 2020-04-29 NOTE — RADIOLOGY REPORT (SQ)
EXAM DESCRIPTION:  COOKIE SWALLOW



IMAGES COMPLETED DATE/TIME:  4/29/2020 10:21 am



REASON FOR STUDY:  coughing, hx of head/neck cancer dysphagia



COMPARISON:  None.



TECHNIQUE:  Videofluoroscopic swallowing examination was performed in conjunction with speech patholo
gy.  Videofluoroscopic imaging was obtained and reviewed and these are the findings:



RADIATION DOSE:  1 minutes 55 seconds of fluoroscopy was used.

1 images saved to PACS.



LIMITATIONS:  None



FINDINGS:  The patient was brought into the fluoro room and placed upright on a modified barium swall
ow chair.  The patient was then given multiple consistencies mixed with barium to swallow under live 
fluoroscopic video guidance.  According to the Speech Pathologist there was laryngeal penetration and
 aspiration of the thin and pudding consistencies.  There is aspiration from the post swallow residua
l contrast in the Piriforms.



IMPRESSION:  LARYNGEAL PENETRATION AND ASPIRATION AS ABOVE. PLEASE SEE SPEECH PATHOLOGIST REPORT FOR 
OTHER FINDINGS AND RECOMMENDATIONS.



COMMENT:  Quality :  Final reports for procedures using fluoroscopy that document radiation exp
osure indices, or exposure time and number of fluorographic images (if radiation exposure indices are
 not available)



TECHNICAL DOCUMENTATION:  JOB ID: [id number]

 2011 [employer]- All Rights Reserved



Reading location - IP/workstation name: [email or web address]

## 2020-04-29 NOTE — PDOC PROGRESS REPORT
Subjective


Progress Note for:: 04/29/20


Subjective:: 


No acute events overnight, had long discussion w/ wife yesterday, also had long 

discussion w/ Dr. Garcia, his hospitalist





Reason For Visit: 


HEMOPTYSIS








Physical Exam


Vital Signs: 


                                        











Temp Pulse Resp BP Pulse Ox


 


 99.3 F   87   18   153/63 H  97 


 


 04/29/20 07:31  04/28/20 23:52  04/28/20 23:52  04/28/20 23:52  04/28/20 23:52








                                 Intake & Output











 04/28/20 04/29/20 04/30/20





 06:59 06:59 06:59


 


Intake Total 2048 1240 


 


Output Total 1595 2050 


 


Balance 453 -810 


 


Weight 75.7 kg 74.7 kg 














Results


Laboratory Results: 


                                        





                                 04/28/20 11:27 





                                 04/27/20 17:42 





                                        











  04/28/20 04/28/20





  10:10 11:27


 


WBC  Cancelled  0.2 L*


 


RBC  Cancelled  2.23 L


 


Hgb  Cancelled  8.0 L


 


Hct  Cancelled  21.5 L


 


MCV  Cancelled  96


 


MCH  Cancelled  35.7 H


 


MCHC  Cancelled  37.0 H


 


RDW  Cancelled  17.2 H


 


Plt Count  Cancelled  39 L


 


Seg Neutrophils %  Cancelled  5.6 L








                                        





04/25/20 12:50   Blood   Blood Culture (PCR) - Final


                            Pseudomonas Aeruginosa


04/25/20 12:50   Blood   Blood Culture - Final


                            Pseudomonas Aeruginosa


04/25/20 21:37   Sputum   Gram Stain - Final


04/25/20 21:37   Sputum   Sputum Culture - Final


                            Pseudomonas Aeruginosa


                            C.albicans/C.dubliniensis


                            Normal [place] Absent








Impressions: 


                                        





Abdomen/Pelvis CT  04/25/20 00:00


IMPRESSION:  NO SIGNIFICANT OR ACUTE FINDING IN THE ABDOMEN OR PELVIS ON CT SCAN

WITH IV CONTRAST.


 








Chest CT  04/25/20 00:00


IMPRESSION:


1. 1 CM NODULE IN THE LEFT LOWER LOBE.  IF THE PATIENT HAS HAD PREVIOUS CHEST CT

AT ANOTHER FACILITY, THEN RECOMMEND COMPARISON.  IF NO PRIOR STUDIES, THEN MAY 

CONSIDER PET SCAN.


2. NO OTHER SIGNIFICANT FINDINGS IN THE CHEST.


 








Chest X-Ray  04/25/20 12:48


IMPRESSION:  NO ACUTE RADIOGRAPHIC FINDING IN THE CHEST.


 








Upper Extremity CT  04/26/20 00:00


IMPRESSION:  No evidence of abscess or osteomyelitis.


 














Assessment & Plan





- Diagnosis


(1) Leukemia


Qualifiers: 


   Leukemia type: myeloid   Myeloid leukemia type: acute   Leukemia 

Active/Remission status: relapsed   Qualified Code(s): C92.02 - Acute 

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 

dysplasia, in relapse   


Is this a current diagnosis for this admission?: Yes   


Plan: 


Maybe worsening, cont current rx for now, told wife and hospitalist team we 

should give until end of week to see how counts, fevers do. If no improvement 

and worsening, we should consider going towards a comfort care approach. Also 

discussion code status with wife who noted as I did that pt previously wanted to

be do not intubate. But this was changed b/c of the hemoptysis and possibility 

of requiring bronchoscopy. B/c of that he agreed to change status. But now we 

should readdress w/ pt to see if he wants to be DNI at least.








(2) Pseudomonas sepsis


Qualifiers: 


   Sepsis acute organ dysfunction status: without acute organ dysfunction   

Qualified Code(s): A41.52 - Sepsis due to Pseudomonas   


Is this a current diagnosis for this admission?: Yes   


Plan: 


cont cefepime for now








(3) Pancytopenia


Is this a current diagnosis for this admission?: Yes   


Plan: 


Transfusion plt if <15, hb <7








(4) Dysphagia


Qualifiers: 


   Dysphagia type: oropharyngeal phase   Qualified Code(s): R13.12 - Dysphagia, 

oropharyngeal phase   


Is this a current diagnosis for this admission?: Yes   


Plan: 


speech eval








(5) Physical deconditioning


Is this a current diagnosis for this admission?: Yes   


Plan: 


PT evaluated before and feel he is ambulatory








- Time


Time Spent with patient: 15-24 minutes

## 2020-04-29 NOTE — ST INP MODIFIED BARIUM SWALLOW
Medical Diagnosis





- Medical Diagnoses


Medical Diagnosis Description & ICD-10 Code(s): neutropenic fever, dysphagia





- ICD-10 Tx Diagnosis Coding


(1) Dysphagia


ICD-10 Code(s): R13.10 - DYSPHAGIA, UNSPECIFIED   





(2) History of head and neck cancer


ICD-10 Code(s): Z85.89 - PERSONAL HISTORY OF MALIGNANT NEOPLASM OF ORGANS AND 

SYSTEMS   





ST Inpatient MBS





- General


Date: 04/29/20


Date of Onset: 04/25/20 - admission date





- History


-: Medical - per EMR: patient admitted 4/25 with progressive productive cough 

and fever. Chest x-ray at that time was unremarkable. Patient has history of 

leukemia on chemotherapy, and hisotry of head and neck cancer, for which he 

received radiation therapy. Patient also has history of diabetes, CVA, 

hypertension. Nursing note shows patient was demonstrating some coughing with 

liquids on 4/27, patient was moved to honey thick liquids at that time. Speech 

therapy evaluation was ordered on 4/28. Therapist spoke with patient, who states

he had tongue cancer with radiation treatment but no surgery, and that he had 

previously had a feeding tube but does not any longer. Given history of 

head/neck cancer and radiation to that area, MBSS was recommended over bedside 

clinical swallow evaluation. Of note: after the study, therapist found a report 

from an esophageal study on 10/23/18 which describes patient with poor oral 

control of bolus and aspiration of liquid barium throughout study.


Medications: Medications Reviewed


Allergies: Refer to medical record





- Subjective


Current Nutritional Means: PO


Current PO Diet: Soft, Thickened liquids - honey


Current Symptoms: Coughing


Pain: Patient reports, 0/5





- Objective


Assessment: Upright, Left Lateral





- Food Trials


Food Trials Used: Thin liquids, Pureed


The Patient: Was Able to Self Feed





- Assessment


Labial Function: Within Normal Limits


Lingual Function: Impaired - reduced base of tongue retraction


Mandibular Function: Within Normal Limits


Dentition: Edentulous


Velo-Pharyngeal Function: Unremarkable





- Pharyngeal Stage


Initiation of Pharyngeal Stage: Delayed - bolus freely flowed into pharyngeal 

cavity from oral, fully established in pyriform sinus prior to swallow 

initiation. Bolus also seen to enter laryngeal vestibule prior to the swallow.


Reduced Velo-Pharyngeal Closure: no


Reduced Pressure Generation: Yes


Reduced Tongue Base Retraction: Yes


Pre-Swallowing Pooling in Valleculae: Significant


Pre-Swallowing Pooling in Pyriforms: Significant


Reduced Epiglottic Excursion: Yes


Multiple Swallows With: Ineffective Clearance


Post Swallow Residuals in Valleculae: Moderate


Post Swallow Residuals in Pyriforms: Significant


Pahryngeal Stage Comments: Patient demonstrated overall impaired pharyngeal 

swallow. Delayed initiation of the swallow observed, as well as reduced base of 

tongue retraction, nearly absent epiglottic inversion, and poor pharyngeal 

constriction. Before and during the swallow, material was seen to penetrate deep

into the laryngeal vestibule, to the level of the vocal folds, possible trace 

aspiration below. After the swallow, significant residue was seen in valleculae 

and pyriform sinus. Post swallow residuals seen to spill from pyriform sinus 

into laryngeal vestibule, with shawn aspiration seen on residuals. Residuals 

increased significantly as texture increased. Patient was unable to clear 

residuals with subsequent swallows. Cough reflex was seen in reaction to 

aspiration, however, patient unable to clear aspirated material.





- Impression/Summary


Laryngeal Penetration: Yes


Tracheal Aspiration: yes - Patient was noted to aspirate on thin liquids, as 

well as on puree solids. Study was stopped after these trials due to the large 

amount of material that was aspirated (even though limited trials were given), 

as well as the implication of aspiration on these two ends of the texture 

spectrum.


Productive Cough: No


Effective Clearing: no


Patient Presents With: Oral-Pharyngeal dysph., Profound


Risk of Aspiration: Severe


Risk of Nutritional Compromise: Severe


Risk Due To: patient at risk of aspiration on the swallow due to delayed swallow

reflex and  poor overall pharyngeal movement. Patient as risk of aspiration 

after the swallow due to significnat pharyngeal residue. Patient at risk of 

aspiration on all PO textures/trials.





- Recommendations


NPO: yes, free water - patient may benefit from free water protocol, as 

aspiration was less with thin liquids than with puree


Other Recommendations: No diet recommendations can be made that do not place the

patient at high risk of aspiration.  Recommend good oral care to reduce risk of 

aspiration of oral bacteria.  Discussed with patient and MD that the patient may

need alternative means of nutrition due to severity of swallowing deficits.  

Patient may benefit from dysphagia therapy once patient is more medically 

stable.





- Time


Total Time: 30


Total Timed Minutes: 30

## 2020-04-30 VITALS — SYSTOLIC BLOOD PRESSURE: 117 MMHG | DIASTOLIC BLOOD PRESSURE: 55 MMHG

## 2020-04-30 LAB
ADD MANUAL DIFF: (no result)
ANISOCYTOSIS BLD QL SMEAR: (no result)
BASOPHILS # BLD AUTO: 0 10^3/UL (ref 0–0.2)
BASOPHILS NFR BLD AUTO: 0 % (ref 0–2)
DACRYOCYTES BLD QL SMEAR: SLIGHT
EOSINOPHIL # BLD AUTO: 0 10^3/UL (ref 0–0.6)
EOSINOPHIL NFR BLD AUTO: 12.8 % (ref 0–6)
ERYTHROCYTE [DISTWIDTH] IN BLOOD BY AUTOMATED COUNT: 17 % (ref 11.5–14)
HCT VFR BLD CALC: 19.7 % (ref 37.9–51)
HGB BLD-MCNC: 7.3 G/DL (ref 13.5–17)
LYMPHOCYTES # BLD AUTO: 0.2 10^3/UL (ref 0.5–4.7)
LYMPHOCYTES NFR BLD AUTO: 69.1 % (ref 13–45)
MACROCYTES BLD QL SMEAR: SLIGHT
MCH RBC QN AUTO: 36.2 PG (ref 27–33.4)
MCHC RBC AUTO-ENTMCNC: 36.9 G/DL (ref 32–36)
MCV RBC AUTO: 98 FL (ref 80–97)
MONOCYTES # BLD AUTO: 0 10^3/UL (ref 0.1–1.4)
MONOCYTES NFR BLD AUTO: 9.9 % (ref 3–13)
NEUTROPHILS # BLD AUTO: 0 10^3/UL (ref 1.7–8.2)
NEUTS SEG NFR BLD AUTO: 8.2 % (ref 42–78)
PLATELET # BLD: 25 10^3/UL (ref 150–450)
PLATELET COMMENT: (no result)
POIKILOCYTOSIS BLD QL SMEAR: SLIGHT
RBC # BLD AUTO: 2.01 10^6/UL (ref 4.35–5.55)
TOTAL CELLS COUNTED % (AUTO): 100 %
WBC # BLD AUTO: 0.2 10^3/UL (ref 4–10.5)

## 2020-04-30 PROCEDURE — 0DH67UZ INSERTION OF FEEDING DEVICE INTO STOMACH, VIA NATURAL OR ARTIFICIAL OPENING: ICD-10-PCS

## 2020-04-30 RX ADMIN — INSULIN GLARGINE SCH UNIT: 100 INJECTION, SOLUTION SUBCUTANEOUS at 18:42

## 2020-04-30 RX ADMIN — CEFEPIME SCH MLS/HR: 2 INJECTION, POWDER, FOR SOLUTION INTRAMUSCULAR; INTRAVENOUS at 13:54

## 2020-04-30 RX ADMIN — INSULIN LISPRO SCH: 100 INJECTION, SOLUTION INTRAVENOUS; SUBCUTANEOUS at 11:52

## 2020-04-30 RX ADMIN — INSULIN LISPRO SCH: 100 INJECTION, SOLUTION INTRAVENOUS; SUBCUTANEOUS at 17:21

## 2020-04-30 RX ADMIN — INSULIN LISPRO SCH UNIT: 100 INJECTION, SOLUTION INTRAVENOUS; SUBCUTANEOUS at 07:39

## 2020-04-30 RX ADMIN — INSULIN LISPRO SCH UNIT: 100 INJECTION, SOLUTION INTRAVENOUS; SUBCUTANEOUS at 17:24

## 2020-04-30 RX ADMIN — FILGRASTIM SCH MCG: 300 INJECTION, SOLUTION INTRAVENOUS; SUBCUTANEOUS at 09:36

## 2020-04-30 RX ADMIN — CEFEPIME SCH MLS/HR: 2 INJECTION, POWDER, FOR SOLUTION INTRAMUSCULAR; INTRAVENOUS at 05:06

## 2020-04-30 RX ADMIN — DOCUSATE SODIUM SCH MG: 100 CAPSULE, LIQUID FILLED ORAL at 09:36

## 2020-04-30 RX ADMIN — VALACYCLOVIR HYDROCHLORIDE SCH MG: 500 TABLET, FILM COATED ORAL at 09:36

## 2020-04-30 NOTE — PDOC PROGRESS REPORT
Subjective


Progress Note for:: 04/30/20


Subjective:: 


Had long discussion w/ pt's leukemia physician at [facility], Dr. Schofield. He felt the 

pancytopenia maybe more related to drug effect, specifically the Venetoclax that

the pt was on prior to admit. We stopped the drug b/c of cytopenias about 7 days

prior to admit. So he felt we should give the pt more time to recover prior to 

considering comfort measures. Of note, since pt has failed all swallow eval, he 

needs nutrition source. PEG placement would be risky in pancytopenic situation 

so Dofhoff placement would be needed. Discussed all of these issues w/ Dr. Garcia who will discuss w/ pt, family and make arrangements.





Reason For Visit: 


HEMOPTYSIS








Physical Exam


Vital Signs: 


                                        











Temp Pulse Resp BP Pulse Ox


 


 99.3 F   79   18   121/38 L  97 


 


 04/30/20 00:00  04/30/20 00:00  04/30/20 00:00  04/30/20 00:00  04/30/20 00:00








                                 Intake & Output











 04/29/20 04/30/20 05/01/20





 06:59 06:59 06:59


 


Intake Total 1240 250 


 


Output Total 2050 300 


 


Balance -810 -50 


 


Weight 74.7 kg 73.4 kg 














Results


Laboratory Results: 


                                        





                                 04/30/20 04:22 





                                 04/27/20 17:42 





                                        











  04/29/20 04/30/20





  13:43 04:22


 


WBC  0.3 L*  0.2 L*


 


RBC  2.25 L  2.01 L


 


Hgb  8.0 L  7.3 L


 


Hct  21.7 L  19.7 L


 


MCV  97  98 H


 


MCH  35.3 H  36.2 H


 


MCHC  36.6 H  36.9 H


 


RDW  17.0 H  17.0 H


 


Plt Count  30 L*  25 L*


 


Seg Neutrophils %  8.9 L  8.2 L











Impressions: 


                                        





Abdomen/Pelvis CT  04/25/20 00:00


IMPRESSION:  NO SIGNIFICANT OR ACUTE FINDING IN THE ABDOMEN OR PELVIS ON CT SCAN

WITH IV CONTRAST.


 








Chest CT  04/25/20 00:00


IMPRESSION:


1. 1 CM NODULE IN THE LEFT LOWER LOBE.  IF THE PATIENT HAS HAD PREVIOUS CHEST CT

AT ANOTHER FACILITY, THEN RECOMMEND COMPARISON.  IF NO PRIOR STUDIES, THEN MAY 

CONSIDER PET SCAN.


2. NO OTHER SIGNIFICANT FINDINGS IN THE CHEST.


 








Chest X-Ray  04/25/20 12:48


IMPRESSION:  NO ACUTE RADIOGRAPHIC FINDING IN THE CHEST.


 








Upper Extremity CT  04/26/20 00:00


IMPRESSION:  No evidence of abscess or osteomyelitis.


 








Modified Barium Swallow  04/29/20 00:00


IMPRESSION:  LARYNGEAL PENETRATION AND ASPIRATION AS ABOVE. PLEASE SEE SPEECH 

PATHOLOGIST REPORT FOR OTHER FINDINGS AND RECOMMENDATIONS.


 














Assessment & Plan





- Diagnosis


(1) Leukemia


Qualifiers: 


   Leukemia type: myeloid   Myeloid leukemia type: acute   Leukemia 

Active/Remission status: relapsed   Qualified Code(s): C92.02 - Acute 

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 

dysplasia, in relapse   


Is this a current diagnosis for this admission?: Yes   


Plan: 


Current pancytopenia felt to be 2nd to medication, cont to monitor for count 

recovery








(2) Pseudomonas sepsis


Qualifiers: 


   Sepsis acute organ dysfunction status: without acute organ dysfunction   

Qualified Code(s): A41.52 - Sepsis due to Pseudomonas   


Is this a current diagnosis for this admission?: Yes   


Plan: 


cont cefepime for now, repeat culture negative, will need at lease 14 days of 

atbx post negative culture in my opinion. He maybe admitted for a good portion 

of that time as we await count recovery.








(3) Pancytopenia


Is this a current diagnosis for this admission?: Yes   


Plan: 


Cont monitoring, blood tx if ct <7, plt ct <10 need transfusion. If pt actively 

bleeding, will need plt transfusion also








(4) Dysphagia


Qualifiers: 


   Dysphagia type: oropharyngeal phase   Qualified Code(s): R13.12 - Dysphagia, 

oropharyngeal phase   


Is this a current diagnosis for this admission?: Yes   


Plan: 


Consider dophoff placement








(5) Physical deconditioning


Is this a current diagnosis for this admission?: Yes   


Plan: 


PT eval pt earlier in week and felt pt had good strength but as this 

hospitalization goes on, we will need reeval b/c pt will probably get weaker.








- Time


Time Spent with patient: 15-24 minutes

## 2020-04-30 NOTE — RADIOLOGY REPORT (SQ)
EXAM DESCRIPTION:  INTRO/GI TUBE W/FLUORO; INTRO LONG GI TUBE (MILLAB)



COMPLETE DATE/TIME:  4/30/2020 11:26 am



REASON FOR STUDY:  dysphagia; DYSPHAGIA

dysphagia; DYSPHAGIA aspiration, feeding tube for nutrition



RADIATION DOSE:  1 minutes 36 seconds of fluoroscopy was used.

1 images saved to PACS.



FINDINGS:  Please see combined report for performance of procedure and radiologic supervision and int
erpretation.

The patient was brought to the fluoroscopy room and placed supine on the fluoroscopy table. A NJ-tube
 was advanced through the left nostril through the stomach and ending in the 4th portion of the duode
num.  Approximately 22 mL of non ionic contrast was injected through the catheter to confirm placemen
t. A fluoroscopic spot film was saved to PACs demonstrating catheter tip within the 3rd to 4th portio
n of the duodenum.



IMPRESSION:  Please see combined report for performance of procedure and radiologic supervision and i
nterpretation.



Reading location - IP/workstation name: [email or web address]

## 2020-04-30 NOTE — PDOC TRANSFER SUMMARY
General


Admission Date/PCP: 


  04/25/20 14:46





  MARILYN PERES MD





Admission Date: 04/25/20


Transfer Date: 04/30/20


Accepting Facility: [facility]


Resuscitation Status: Do Not Intubate





- Transfer Diagnosis


(1) Pancytopenia


Is this a current diagnosis for this admission?: Yes   


Diagnosis Summary: 


Thought to be due to to his chemotherapy treatment at this time and not due to 

his leukemia.  Has not responded to Neupogen x4 days.








(2) Neutropenic fever


Is this a current diagnosis for this admission?: Yes   


Diagnosis Summary: 


Pancytopenic, counts have not responded to Neupogen, he has been on cefepime for

a Pseudomonas bacteremia secondary to a Pseudomonas pneumonia.  Testing for 

SARS-CoV2 was negative.








(3) Acute respiratory failure with hypoxemia


Is this a current diagnosis for this admission?: Yes   


Diagnosis Summary: 


Now resolved, he had hemoptysis early on and required monitoring overnight in 

the ICU, thought he would require intubation at one point but fortunately did 

not need to have that done, has been on room air for the last few days








(4) Pseudomonas pneumonia


Is this a current diagnosis for this admission?: Yes   


Diagnosis Summary: 


He grew a pansensitive Pseudomonas out of his sputum, has been on cefepime 

because of the severity of his illness but also because he has had a severe 

reaction to a fluoroquinolone in the past








(5) Pseudomonal bacteremia


Is this a current diagnosis for this admission?: Yes   


Diagnosis Summary: 


Secondary to his Pseudomonas pneumonia, repeat blood cultures have been negative

x48 hours








(6) Leukemia


Is this a current diagnosis for this admission?: Yes   


Diagnosis Summary: 


Has a history of AML, had been on Venetoclax (PO) and azacitadine (IV)  Most r

ecent dose was 4/7/2020.  He was taken off of it at that time because his blood 

counts were low.  He primarily follows with Dr. Schofield at [facility].








(7) Dysphagia


Is this a current diagnosis for this admission?: Yes   


Diagnosis Summary: 


He had had a PEG tube previously when he had his head and neck cancer, but had 

been swallowing fine recently.  He failed a modified barium swallow with all 

consistencies.  It is primarily felt that this is due to physical deconditioning

and weakness.  A Dobbhoff tube has been placed to get him started on some tube 

feeds until he can swallow more safely.








(8) History of head and neck cancer


Is this a current diagnosis for this admission?: Yes   


Diagnosis Summary: 


He had a cancer of the tongue several years ago and completed treatment, 

including surgery to remove part of the tongue.








(9) Hyponatremia


Is this a current diagnosis for this admission?: Yes   


Diagnosis Summary: 


This is been very mild, he typically runs low, his usual range seems to be about

128-131.








- Transfer Medications


Home Medications: 


                                        





Olanzapine [Zyprexa] 20 mg PO QHS 07/21/18 


Sertraline HCl 100 mg PO QHS 07/21/18 


Cefdinir 300 mg PO BID 04/25/20 


Insulin Glargine,Hum.rec.anlog [Lantus Insulin 100 Unit/mL Insulin Pen] 8 units 

IJ QPM 04/25/20 


Insulin Lispro [Humalog Kwikpen] 16 unit SQ ASDIR PRN 04/25/20 


Valacyclovir HCl [Valacyclovir] 500 mg PO DAILY 04/25/20 








Transfer Medications: 


                               Current Medications





Acetaminophen (Tylenol 325 Mg Tablet)  650 mg PO Q4HP PRN


   PRN Reason: pain or fever


   Stop: 05/25/20 15:21


   Last Admin: 04/29/20 16:18 Dose:  650 mg


   Documented by: 


Albuterol/Ipratropium (Duoneb 3 Ml Ampul)  3 ml NEB RTQ2HP PRN


   PRN Reason: SHORTNESS OF BREATH


   Stop: 05/25/20 15:21


Dextrose (Dextrose Inj 50% Syringe (25 Gm/50 Ml))  12.5 gm IV PRN PRN; Protocol


   PRN Reason: FOR BG 50-69 IN ALERT PATIENT


   Stop: 05/25/20 15:59


Dextrose (Dextrose Inj 50% Syringe (25 Gm/50 Ml))  25 gm IV PRN PRN; Protocol


   Stop: 05/25/20 15:59


Docusate Sodium (Colace 100 Mg Capsule)  100 mg PO DAILY ISAAC


   Stop: 05/29/20 09:59


   Last Admin: 04/30/20 09:36 Dose:  100 mg


   Documented by: 


Filgrastim (Neupogen Inj 300 Mcg/1 Ml Vial)  300 mcg SUBCUT DAILY ISAAC


   Stop: 05/04/20 09:59


   Last Admin: 04/30/20 09:36 Dose:  300 mcg


   Documented by: 


Glucagon (Glucagen Inj 1 Mg Vial)  1 mg IM PRN PRN; Protocol


   PRN Reason: EVALUATE FOR BG < 70


   Stop: 05/25/20 15:59


Glucose (Glutose 40% Gel 15 Gm Tube)  15 gm PO PRN PRN; Protocol


   PRN Reason: FOR BG 50-69 IN ALERT PATIENT


   Stop: 05/25/20 15:59


Glucose (Glutose 40% Gel 15 Gm Tube)  30 gm PO PRN PRN; Protocol


   PRN Reason: FOR BG < 50 IN ALERT PATIENT


   Stop: 05/25/20 15:59


Cefepime HCl 2 gm/ Dextrose  50 mls @ 100 mls/hr IV Q8 [facility]


   Stop: 05/02/20 21:59


   Last Infusion: 04/30/20 14:24 Dose:  Infused


   Documented by: 


Insulin Glargine (Lantus Insulin 100 Unit/1 Ml 10 Ml)  12 unit SUBCUT QPM [facility]


   Stop: 05/29/20 17:59


   Last Admin: 04/29/20 18:46 Dose:  12 unit


   Documented by: 


Insulin Human Lispro (Humalog Insulin 100 Unit/1 Ml 3 Ml Vial)  0 - 12 unit 

SUBCUT ACHS [facility]; Protocol


   Stop: 05/27/20 05:59


   Last Admin: 04/30/20 11:52 Dose:  Not Given


   Documented by: 


Insulin Human Lispro (Humalog Insulin 100 Unit/1 Ml 3 Ml Vial)  4 unit SUBCUT AC

 [facility]


   Stop: 05/29/20 10:59


   Last Admin: 04/30/20 11:52 Dose:  Not Given


   Documented by: 


Olanzapine (Zyprexa 5 Mg Tablet)  20 mg PO QHS [facility]


   Stop: 05/25/20 21:59


   Last Admin: 04/29/20 21:38 Dose:  20 mg


   Documented by: 


Ondansetron HCl (Zofran Inj/Pf 4 Mg/2 Ml Sdv)  4 mg IV Q4HP PRN


   PRN Reason: FOR NAUSEA/VOMITING


   Stop: 05/25/20 15:21


Oxycodone HCl (Oxy-Ir 5 Mg Tablet)  5 mg PO Q6HP PRN


   PRN Reason: FOR PAIN


   Stop: 05/02/20 15:50


Sertraline HCl (Zoloft 50 Mg Tablet)  100 mg PO QHS [facility]


   Stop: 05/25/20 21:59


   Last Admin: 04/29/20 21:39 Dose:  100 mg


   Documented by: 


Valacyclovir HCl (Valtrex 500 Mg Tablet)  500 mg PO DAILY [facility]


   Stop: 05/26/20 09:59


   Last Admin: 04/30/20 09:36 Dose:  500 mg


   Documented by: 











- Allergies


Allergies/Adverse Reactions: 


                                        





levofloxacin [From Levaquin] Allergy (Severe, Verified 04/28/20 10:21)


   Seizures


morphine Allergy (Verified 04/25/20 12:57)


   











- Diet/Activity


Discharge Diet: Tube Feeding (Comments)


Discharge Activity: Energy Conservation, Supervised Activity





Hospital Course


Hospital Course: 





He initially came in because of a cough, fever, and positive sputum production. 

His pancytopenia was known, and he had been taken off of his chemotherapy for 

AML as noted above prior to his presentation because of the low blood counts.  

He was empirically started on broad-spectrum antibiotics.  He had an episode of 

impressive hemoptysis and his hemoglobin dropped even more, from 8 down to 6.4. 

He was given 2 units of packed red blood cells and some platelets.  He has not 

had any further bleeding episodes since that time.  He was initially sent to the

ICU overnight because it was thought he may need to be intubated, but 

fortunately he was able to avoid that.  He has been out of the ICU for about 3 

days now and has been on room air the entire time.  He has received Neupogen for

4 days and has had no improvement in any of his blood counts.  A sputum specimen

was obtained early on in the hospitalization and it showed a pansensitive 

Pseudomonas pneumonia, which corresponded with a positive blood culture for the 

same.  He has been on cefepime, and repeat blood cultures at time of dictation 

have been negative for over 48 hours.  He continues to have episodic fevers, 

usually once or twice a day his fever will go as high as 102 Fahrenheit to 103 

Fahrenheit.  He has tested negative for influenza and SARS-CoV2.  He had a 

history of a head neck cancer, and the tongue, and had a PEG tube at one time, 

but had recovered and did not need the PEG tube anymore and so he has been 

eating and drinking fine up until this hospitalization.  We did a modified 

barium swallow and he aspirated all consistencies and speech therapy recommended

n.p.o.  Patient has persistently low platelets that is high risk of bleeding, so

rather than a PEG tube at this time, we successfully placed a Dobbhoff tube for 

tube feeding.  It is thought that this trouble swallowing is likely due to 

deconditioning.  His oncologist Dr. Schofield at [facility] has been and contact regularly

with our oncologist Dr. Peres, and Dr. Simon offered to take the patient if 

he was showing no signs of progress.  The patient's family also wanted him 

transferred.  I spoke to Dr. Leyva, the oncology fellow on-call at [facility], with 

whom I reviewed the case.  Dr. Leyva agreed to accept the patient in transfer.  

He asked that we test the patient for coronavirus prior to transfer, and we did,

and the results were as noted above.





Physical Exam


Vital Signs: 


                                        











Temp Pulse Resp BP Pulse Ox


 


 99.3 F   79   18   121/38 L  97 


 


 04/30/20 00:00  04/30/20 00:00  04/30/20 00:00  04/30/20 00:00  04/30/20 00:00








                                 Intake & Output











 04/29/20 04/30/20 05/01/20





 06:59 06:59 06:59


 


Intake Total 1240 300 110


 


Output Total 2050 300 200


 


Balance -810 0 -90


 


Weight 74.7 kg 73.4 kg 73.4 kg








General appearance: PRESENT: no acute distress, cooperative, disheveled


Respiratory exam: PRESENT: clear to auscultation mary, symmetrical, unlabored.  

ABSENT: accessory muscle use, chest wall tenderness, crackles, prolonged 

expiratory phas, retraction, rhonchi, tachypnea, wheezes


Cardiovascular exam: PRESENT: RRR, +S1, +S2


Pulses: PRESENT: normal carotid pulses


Vascular exam: PRESENT: normal capillary refill


GI/Abdominal exam: PRESENT: normal bowel sounds, soft.  ABSENT: distended, 

guarding, rebound, tenderness


Extremities exam: ABSENT: clubbing, pedal edema


Musculoskeletal exam: PRESENT: normal inspection.  ABSENT: deformity


Neurological exam: PRESENT: alert, awake, oriented to person, oriented to place,

oriented to situation


Psychiatric exam: PRESENT: appropriate affect, normal mood


Skin exam: PRESENT: dry, warm





Results


Laboratory Results: 


                                        





                                 04/30/20 04:22 





                                 04/27/20 17:42 





                                        











  04/30/20





  04:22


 


WBC  0.2 L*


 


RBC  2.01 L


 


Hgb  7.3 L


 


Hct  19.7 L


 


MCV  98 H


 


MCH  36.2 H


 


MCHC  36.9 H


 


RDW  17.0 H


 


Plt Count  25 L*


 


Seg Neutrophils %  8.2 L








                                        





04/25/20 14:05   Blood   Blood Culture - Final


                            NO GROWTH IN 5 DAYS








Impressions: 


                                        





Abdomen/Pelvis CT  04/25/20 00:00


IMPRESSION:  NO SIGNIFICANT OR ACUTE FINDING IN THE ABDOMEN OR PELVIS ON CT SCAN

WITH IV CONTRAST.


 








Chest CT  04/25/20 00:00


IMPRESSION:


1. 1 CM NODULE IN THE LEFT LOWER LOBE.  IF THE PATIENT HAS HAD PREVIOUS CHEST CT

AT ANOTHER FACILITY, THEN RECOMMEND COMPARISON.  IF NO PRIOR STUDIES, THEN MAY 

CONSIDER PET SCAN.


2. NO OTHER SIGNIFICANT FINDINGS IN THE CHEST.


 








Chest X-Ray  04/25/20 12:48


IMPRESSION:  NO ACUTE RADIOGRAPHIC FINDING IN THE CHEST.


 








Upper Extremity CT  04/26/20 00:00


IMPRESSION:  No evidence of abscess or osteomyelitis.


 








Modified Barium Swallow  04/29/20 00:00


IMPRESSION:  LARYNGEAL PENETRATION AND ASPIRATION AS ABOVE. PLEASE SEE SPEECH 

PATHOLOGIST REPORT FOR OTHER FINDINGS AND RECOMMENDATIONS.


 








Gastrostomy Tube Placement  04/30/20 00:00


IMPRESSION:  Please see combined report for performance of procedure and 

radiologic supervision and interpretation.


 








Guidance Fluoroscopy  04/30/20 00:00


IMPRESSION:  Please see combined report for performance of procedure and radiol

ogic supervision and interpretation.


 














Plan


Time Spent: Greater than 30 Minutes

## 2020-04-30 NOTE — RADIOLOGY REPORT (SQ)
EXAM DESCRIPTION:  INTRO/GI TUBE W/FLUORO; INTRO LONG GI TUBE (MILLAB)



COMPLETE DATE/TIME:  4/30/2020 11:26 am



REASON FOR STUDY:  dysphagia; DYSPHAGIA

dysphagia; DYSPHAGIA aspiration, feeding tube for nutrition



RADIATION DOSE:  1 minutes 36 seconds of fluoroscopy was used.

1 images saved to PACS.



FINDINGS:  Please see combined report for performance of procedure and radiologic supervision and int
erpretation.

The patient was brought to the fluoroscopy room and placed supine on the fluoroscopy table. A NJ-tube
 was advanced through the left nostril through the stomach and ending in the 4th portion of the duode
num.  Approximately 22 mL of non ionic contrast was injected through the catheter to confirm placemen
t. A fluoroscopic spot film was saved to PACs demonstrating catheter tip within the 3rd to 4th portio
n of the duodenum.



IMPRESSION:  Please see combined report for performance of procedure and radiologic supervision and i
nterpretation.



Reading location - IP/workstation name: [id number]

## 2020-07-29 ENCOUNTER — HOSPITAL ENCOUNTER (OUTPATIENT)
Dept: HOSPITAL 62 - RAD | Age: 75
End: 2020-07-29
Attending: INTERNAL MEDICINE
Payer: MEDICARE

## 2020-07-29 DIAGNOSIS — R05: Primary | ICD-10-CM

## 2020-07-29 PROCEDURE — 71046 X-RAY EXAM CHEST 2 VIEWS: CPT

## 2020-07-29 NOTE — RADIOLOGY REPORT (SQ)
EXAM DESCRIPTION:  CHEST 2 VIEWS



IMAGES COMPLETED DATE/TIME:  7/29/2020 11:57 am



REASON FOR STUDY:  R05 COUGH



COMPARISON:  4/25/2020



EXAM PARAMETERS:  NUMBER OF VIEWS: two views

TECHNIQUE: Digital Frontal and Lateral radiographic views of the chest acquired.

RADIATION DOSE: NA

LIMITATIONS: none



FINDINGS:  LUNGS AND PLEURA: No opacities, masses or pneumothorax. No pleural effusion.

MEDIASTINUM AND HILAR STRUCTURES: No masses or contour abnormalities.

HEART AND VASCULAR STRUCTURES: Heart normal size.  No evidence for failure.

BONES: No acute findings.

HARDWARE: None in the chest.

OTHER: No other significant finding.



IMPRESSION:  NO ACUTE RADIOGRAPHIC FINDING IN THE CHEST.



TECHNICAL DOCUMENTATION:  JOB ID:  [id number]

 2011 [employer]- All Rights Reserved



Reading location - IP/workstation name: [email or web address] Patient seen and examined. Case discussed with treatment team. Per nursing report, patient seems less irritable on the unit although still mildly paranoid.      [place]  used on evaluation from treatment team - Joann ([id number])   On evaluation, patient reports feeling "fine". States he is ready to go home. Reports feeling less suspicious of others. Speaks about his time in [place] and how it was very difficult and there is a lot of persecution there. Reports being glad he is in [place]. Does not feel like anyone from [place] or the [facility] is out to get him. Says he has "left that all behind". Is now discharged focused ready to get his life back on track. Reports good appetite, sleep. Has been showering, grooming himself, maintaining ADLs. Denies depressed mood, SI, HI, thoughts of self harm.

## 2021-11-09 NOTE — PDOC PROGRESS REPORT
Subjective


Progress Note for:: 04/29/20


Subjective:: 





No adverse events overnight.  Still having episodic fevers.  Remains 

neutropenic.  Had a modified barium swallow study today and aspirated all 

consistencies.  Speech therapy was recommending n.p.o. for his safety.


Reason For Visit: 


HEMOPTYSIS








Physical Exam


Vital Signs: 


                                        











Temp Pulse Resp BP Pulse Ox


 


 103.0 F H  92   17   143/55 H  100 


 


 04/29/20 15:46  04/29/20 15:46  04/29/20 15:46  04/29/20 15:46  04/29/20 15:46








                                 Intake & Output











 04/28/20 04/29/20 04/30/20





 06:59 06:59 06:59


 


Intake Total 2048 1240 100


 


Output Total 1595 2050 


 


Balance 453 -810 100


 


Weight 75.7 kg 74.7 kg 








General appearance: PRESENT: no acute distress, cooperative, disheveled


Respiratory exam: PRESENT: clear to auscultation mary, symmetrical, unlabored.  

ABSENT: accessory muscle use, chest wall tenderness, crackles, prolonged 

expiratory phas, retraction, rhonchi, tachypnea, wheezes


Cardiovascular exam: PRESENT: RRR, +S1, +S2


Pulses: PRESENT: normal carotid pulses


Vascular exam: PRESENT: normal capillary refill


GI/Abdominal exam: PRESENT: normal bowel sounds, soft.  ABSENT: distended, 

guarding, rebound, tenderness


Extremities exam: ABSENT: clubbing, pedal edema


Musculoskeletal exam: PRESENT: normal inspection.  ABSENT: deformity


Neurological exam: PRESENT: alert, awake, oriented to person, oriented to place,

oriented to situation


Psychiatric exam: PRESENT: appropriate affect, normal mood


Skin exam: PRESENT: dry, warm





Results


Laboratory Results: 


                                        





                                 04/29/20 13:43 





                                 04/27/20 17:42 





                                        











  04/29/20





  13:43


 


WBC  0.3 L*


 


RBC  2.25 L


 


Hgb  8.0 L


 


Hct  21.7 L


 


MCV  97


 


MCH  35.3 H


 


MCHC  36.6 H


 


RDW  17.0 H


 


Plt Count  30 L*


 


Seg Neutrophils %  8.9 L











Impressions: 


                                        





Abdomen/Pelvis CT  04/25/20 00:00


IMPRESSION:  NO SIGNIFICANT OR ACUTE FINDING IN THE ABDOMEN OR PELVIS ON CT SCAN

WITH IV CONTRAST.


 








Chest CT  04/25/20 00:00


IMPRESSION:


1. 1 CM NODULE IN THE LEFT LOWER LOBE.  IF THE PATIENT HAS HAD PREVIOUS CHEST CT

AT ANOTHER FACILITY, THEN RECOMMEND COMPARISON.  IF NO PRIOR STUDIES, THEN MAY 

CONSIDER PET SCAN.


2. NO OTHER SIGNIFICANT FINDINGS IN THE CHEST.


 








Chest X-Ray  04/25/20 12:48


IMPRESSION:  NO ACUTE RADIOGRAPHIC FINDING IN THE CHEST.


 








Upper Extremity CT  04/26/20 00:00


IMPRESSION:  No evidence of abscess or osteomyelitis.


 








Modified Barium Swallow  04/29/20 00:00


IMPRESSION:  LARYNGEAL PENETRATION AND ASPIRATION AS ABOVE. PLEASE SEE SPEECH 

PATHOLOGIST REPORT FOR OTHER FINDINGS AND RECOMMENDATIONS.


 














Assessment and Plan





- Diagnosis


(1) Pseudomonas pneumonia


Qualifiers: 


   Laterality: unspecified laterality   Lung location: unspecified part of lung 

 Qualified Code(s): J15.1 - Pneumonia due to Pseudomonas   


Is this a current diagnosis for this admission?: Yes   


Plan: 


He grew a pansensitive Pseudomonas out of his sputum culture.  Currently on 

cefepime.  I would like to switch him over to fluoroquinolone but apparently he 

has had a very bad reaction to Levaquin in the past.








(2) Acute respiratory failure with hypoxemia


Is this a current diagnosis for this admission?: Yes   


Plan: 


Resolved








(3) Leukemia


Qualifiers: 


   Leukemia type: myeloid   Myeloid leukemia type: acute   Leukemia 

Active/Remission status: relapsed   Qualified Code(s): C92.02 - Acute 

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 

dysplasia, in relapse   


Is this a current diagnosis for this admission?: Yes   


Plan: 





Unknown type of leukemia as nothing is documented in the chart


Currently undergoing chemotherapy per patient for the last 5 to 6 months, last 

chemo was 1 week PTA


Oncology consulted on admission








(4) Neutropenic fever


Is this a current diagnosis for this admission?: Yes   


Plan: 


Currently on cefepime, oncology has been consulted, he is on Neupogen.  He will 

remain on cefepime until he is afebrile for 48 hours with an absolute neutrophil

count greater than 500.








(5) Pseudomonal bacteremia


Is this a current diagnosis for this admission?: Yes   


Plan: 


Secondary to his Pseudomonas pneumonia.  On cefepime as noted above.








- Time


Time Spent with patient: 15-24 minutes 21-Jul-2021